# Patient Record
Sex: MALE | Race: WHITE | Employment: FULL TIME | URBAN - METROPOLITAN AREA
[De-identification: names, ages, dates, MRNs, and addresses within clinical notes are randomized per-mention and may not be internally consistent; named-entity substitution may affect disease eponyms.]

---

## 2021-07-19 RX ORDER — SILDENAFIL 100 MG/1
TABLET, FILM COATED ORAL
COMMUNITY
Start: 2020-09-02

## 2021-07-19 NOTE — PROGRESS NOTES
[facility] time__7/26/21 1330__________        Surgery time____1430________    Take the following medications with a sip of water:    Do not eat or drink anything after 12:00 midnight prior to your surgery. This includes water chewing gum, mints and ice chips. You may brush your teeth and gargle the morning of your surgery, but do not swallow the water     Please see your family doctor/pediatrician for a history and physical and/or concerning medications. Bring any test results/reports from your physicians office. If you are under the care of a heart doctor or specialist doctor, please be aware that you may be asked to them for clearance    You may be asked to stop blood thinners such as Coumadin, Plavix, Fragmin, Lovenox, etc., or any anti-inflammatories such as:  Aspirin, Ibuprofen, Advil, Naproxen prior to your surgery. We also ask that you stop any OTC medications such as fish oil, vitamin E, glucosamine, garlic, Multivitamins, COQ 10, etc.    We ask that you do not smoke 24 hours prior to surgery  We ask that you do not  drink any alcoholic beverages 24 hours prior to surgery     You must make arrangements for a responsible adult to take you home after your surgery. For your safety you will not be allowed to leave alone or drive yourself home. Your surgery will be cancelled if you do not have a ride home. Also for your safety, it is strongly suggested that someone stay with you the first 24 hours after your surgery. A parent or legal guardian must accompany a child scheduled for surgery and plan to stay at the hospital until the child is discharged. Please do not bring other children with you. For your comfort, please wear simple loose fitting clothing to the hospital.  Please do not bring valuables.     Do not wear any make-up or nail polish on your fingers or toes      For your safety, please do not wear any jewelry or body piercing's on the day of surgery. All jewelry must be removed. If you have dentures, they will be removed before going to operating room. For your convenience, we will provide you with a container. If you wear contact lenses or glasses, they will be removed, please bring a case for them. If you have a living will and a durable power of  for healthcare, please bring in a copy. As part of our patient safety program to minimize surgical site infections, we ask you to do the following:    · Please notify your surgeon if you develop any illness between         now and the  day of your surgery. · This includes a cough, cold, fever, sore throat, nausea,         or vomiting, and diarrhea, etc.  ·  Please notify your surgeon if you experience dizziness, shortness         of breath or blurred vision between now and the time of your surgery. Do not shave your operative site 96 hours prior to surgery. For face and neck surgery, men may use an electric razor 48 hours   prior to surgery. You may shower the night before surgery or the morning of   your surgery with an antibacterial soap. You will need to bring a photo ID and insurance card    [facility] has an onsite pharmacy, would you like to utilize our pharmacy     If you will be staying overnight and use a C-pap machine, please bring   your C-pap to hospital     Our goal is to provide you with excellent care, therefore, visitors will be limited to two(2) in the room at a time so that we may focus on providing this care for you. Please contact pre-admission testing if you have any further questions. [facility] phone number:  [phone]  Please note these are generalized instructions for all surgical cases, you may be provided with more specific instructions according to your surgery.

## 2021-07-23 ENCOUNTER — ANESTHESIA EVENT (OUTPATIENT)
Dept: ENDOSCOPY | Age: 56
End: 2021-07-23
Payer: COMMERCIAL

## 2021-07-26 ENCOUNTER — HOSPITAL ENCOUNTER (OUTPATIENT)
Age: 56
Setting detail: OUTPATIENT SURGERY
Discharge: HOME | End: 2021-07-26
Attending: INTERNAL MEDICINE | Admitting: INTERNAL MEDICINE
Payer: COMMERCIAL

## 2021-07-26 ENCOUNTER — ANESTHESIA (OUTPATIENT)
Dept: ENDOSCOPY | Age: 56
End: 2021-07-26
Payer: COMMERCIAL

## 2021-07-26 VITALS
BODY MASS INDEX: 24.84 KG/M2 | DIASTOLIC BLOOD PRESSURE: 74 MMHG | WEIGHT: 199.8 LBS | SYSTOLIC BLOOD PRESSURE: 120 MMHG | RESPIRATION RATE: 16 BRPM | TEMPERATURE: 96.5 F | OXYGEN SATURATION: 98 % | HEIGHT: 75 IN | HEART RATE: 65 BPM

## 2021-07-26 VITALS — DIASTOLIC BLOOD PRESSURE: 99 MMHG | OXYGEN SATURATION: 95 % | SYSTOLIC BLOOD PRESSURE: 116 MMHG

## 2021-07-26 DIAGNOSIS — Z86.010 HISTORY OF COLON POLYPS: ICD-10-CM

## 2021-07-26 PROCEDURE — 3700000000 HC ANESTHESIA ATTENDED CARE: Performed by: INTERNAL MEDICINE

## 2021-07-26 PROCEDURE — 88305 TISSUE EXAM BY PATHOLOGIST: CPT

## 2021-07-26 PROCEDURE — 7100000010 HC PHASE II RECOVERY - FIRST 15 MIN: Performed by: INTERNAL MEDICINE

## 2021-07-26 PROCEDURE — 3609010300 HC COLONOSCOPY W/BIOPSY SINGLE/MULTIPLE: Performed by: INTERNAL MEDICINE

## 2021-07-26 PROCEDURE — 7100000011 HC PHASE II RECOVERY - ADDTL 15 MIN: Performed by: INTERNAL MEDICINE

## 2021-07-26 PROCEDURE — 2709999900 HC NON-CHARGEABLE SUPPLY: Performed by: INTERNAL MEDICINE

## 2021-07-26 PROCEDURE — 2580000003 HC RX 258: Performed by: ANESTHESIOLOGY

## 2021-07-26 PROCEDURE — 3700000001 HC ADD 15 MINUTES (ANESTHESIA): Performed by: INTERNAL MEDICINE

## 2021-07-26 PROCEDURE — 6360000002 HC RX W HCPCS: Performed by: NURSE ANESTHETIST, CERTIFIED REGISTERED

## 2021-07-26 RX ORDER — SODIUM CHLORIDE 0.9 % (FLUSH) 0.9 %
10 SYRINGE (ML) INJECTION EVERY 12 HOURS SCHEDULED
Status: DISCONTINUED | OUTPATIENT
Start: 2021-07-26 | End: 2021-07-26 | Stop reason: HOSPADM

## 2021-07-26 RX ORDER — SODIUM CHLORIDE 9 MG/ML
25 INJECTION, SOLUTION INTRAVENOUS PRN
Status: DISCONTINUED | OUTPATIENT
Start: 2021-07-26 | End: 2021-07-26 | Stop reason: HOSPADM

## 2021-07-26 RX ORDER — SODIUM CHLORIDE 9 MG/ML
INJECTION, SOLUTION INTRAVENOUS CONTINUOUS
Status: DISCONTINUED | OUTPATIENT
Start: 2021-07-26 | End: 2021-07-26 | Stop reason: HOSPADM

## 2021-07-26 RX ORDER — ONDANSETRON 2 MG/ML
4 INJECTION INTRAMUSCULAR; INTRAVENOUS
Status: DISCONTINUED | OUTPATIENT
Start: 2021-07-26 | End: 2021-07-26 | Stop reason: HOSPADM

## 2021-07-26 RX ORDER — PROMETHAZINE HYDROCHLORIDE 25 MG/ML
6.25 INJECTION, SOLUTION INTRAMUSCULAR; INTRAVENOUS
Status: DISCONTINUED | OUTPATIENT
Start: 2021-07-26 | End: 2021-07-26 | Stop reason: HOSPADM

## 2021-07-26 RX ORDER — PROPOFOL 10 MG/ML
INJECTION, EMULSION INTRAVENOUS PRN
Status: DISCONTINUED | OUTPATIENT
Start: 2021-07-26 | End: 2021-07-26 | Stop reason: SDUPTHER

## 2021-07-26 RX ORDER — LABETALOL HYDROCHLORIDE 5 MG/ML
5 INJECTION, SOLUTION INTRAVENOUS EVERY 10 MIN PRN
Status: DISCONTINUED | OUTPATIENT
Start: 2021-07-26 | End: 2021-07-26 | Stop reason: HOSPADM

## 2021-07-26 RX ORDER — SODIUM CHLORIDE 0.9 % (FLUSH) 0.9 %
10 SYRINGE (ML) INJECTION PRN
Status: DISCONTINUED | OUTPATIENT
Start: 2021-07-26 | End: 2021-07-26 | Stop reason: HOSPADM

## 2021-07-26 RX ADMIN — SODIUM CHLORIDE: 9 INJECTION, SOLUTION INTRAVENOUS at 13:52

## 2021-07-26 RX ADMIN — PROPOFOL 120 MG: 10 INJECTION, EMULSION INTRAVENOUS at 14:05

## 2021-07-26 RX ADMIN — PROPOFOL 180 MG: 10 INJECTION, EMULSION INTRAVENOUS at 14:20

## 2021-07-26 ASSESSMENT — PAIN SCALES - GENERAL
PAINLEVEL_OUTOF10: 0

## 2021-07-26 ASSESSMENT — PAIN - FUNCTIONAL ASSESSMENT: PAIN_FUNCTIONAL_ASSESSMENT: 0-10

## 2021-07-26 NOTE — ANESTHESIA POSTPROCEDURE EVALUATION
[facility] Department of Anesthesiology  Post-Anesthesia Note       Name:  Marysue Cushing                                  Age:  60 y.o. MRN:  [id number]     Last Vitals & Oxygen Saturation: /64   Pulse 76   Temp 96.5 °F (35.8 °C) (Temporal)   Resp 14   Ht 6' 3\" (1.905 m)   Wt 199 lb 12.8 oz (90.6 kg)   SpO2 96%   BMI 24.97 kg/m²   Patient Vitals for the past 4 hrs:   BP Temp Temp src Pulse Resp SpO2 Height Weight   07/26/21 1425 110/64 96.5 °F (35.8 °C) Temporal 76 14 96 %     07/26/21 1341 (!) 158/73 96.9 °F (36.1 °C) Temporal 72 18 100 % 6' 3\" (1.905 m) 199 lb 12.8 oz (90.6 kg)       Level of consciousness:  Awake, alert    Respiratory: Respirations easy, no distress. Stable. Cardiovascular: Hemodynamically stable. Hydration: Adequate. PONV: Adequately managed. Post-op pain: Adequately controlled. Post-op assessment: Tolerated anesthetic well without complication. Complications:  None.     Dianah Moritz, MD  July 26, 2021   2:32 PM

## 2021-07-26 NOTE — H&P
[employer] Services:     Active Member of Clubs or Organizations:     Attends Club or Organization Meetings:     Marital Status:    Intimate Partner Violence:     Fear of Current or Ex-Partner:     Emotionally Abused:     Physically Abused:     Sexually Abused:      Family History   Problem Relation Age of Onset    Arrhythmia Mother     Heart Failure Mother     Dementia Mother     Diabetes Father     Heart Disease Father     High Blood Pressure Father          PHYSICAL EXAM:      BP (!) 158/73   Pulse 72   Temp 96.9 °F (36.1 °C) (Temporal)   Resp 18   Ht 6' 3\" (1.905 m)   Wt 199 lb 12.8 oz (90.6 kg)   SpO2 100%   BMI 24.97 kg/m²  I        Heart:normal    Lungs: normal    Abdomen: normal      ASA Grade:  See anesthesia note      ASSESSMENT AND PLAN:    1. Procedure options, risks and benefits reviewed with patient and expresses understanding.

## 2021-07-26 NOTE — PROCEDURES
[facility]  Endoscopy Note    Patient: Kiley Lynch  : 1965  Acct#: [de-identified]    Procedure: Colonoscopy with biopsy    Date:  2021    Surgeon:  Alpa Saleem MD, MD    Referring Physician:  Diamond    Previous Colonoscopy: Yes  Date: 14  Greater than 3 years? Yes    Preoperative Diagnosis:  surveillance    Postoperative Diagnosis:  Colon polyp    Anesthesia:  See anesthesia note    Indications: This is a 60y.o. year old male who presents today with surveillance. Procedure: An informed consent was obtained from the patient after explanation of indications, benefits, possible risks and complications of the procedure. The patient was then taken to the endoscopy suite, placed in the left lateral decubitus position, and the above IV anesthesia was administered. A digital rectal examination was performed and revealed negative without mass, lesions or tenderness. The Olympus CFQ-180-AL video colonoscope was placed in the patient's rectum under digital direction and advanced to the cecum. The cecum was identified by characteristic anatomy and ballottment. The ileocecal valve was identified. The preparation was fair. The scope was then withdrawn back through the cecum, ascending, transverse, descending and sigmoid colons. Carefull circumferential examination of the mucosa in these areas demonstrated a 3 mm polyp in the descending colon that was biopsied and removed. The scope was then withdrawn into the rectum and retroflexed. The retroflexed view of the anal verge and rectum demonstrates no abnormalities. The scope was straightened, the colon was decompressed and the scope was withdrawn from the patient. The patient tolerated the procedure well and was taken to the PACU in good condition. Estimated Blood Loss:  none    Impression:  Colon polyp    Recommendations:  Await pathology. Repeat colonoscopy in 5 years.     Alpa Saleem MD, MD Alfred

## 2021-07-26 NOTE — ANESTHESIA PRE PROCEDURE
56  Max: 67   Max taken time: 21 1341  Resp  Av  Min: 25   Min taken time: 21 1341  Max: 18   Max taken time: 21 1341  BP  Min: 158/73   Min taken time: 21 1341  Max: 158/73   Max taken time: 21 1341  SpO2  Av %  Min: 100 %   Min taken time: 21 1341  Max: 100 %   Max taken time: 21 1341    BP Readings from Last 3 Encounters:   21 (!) 158/73     BMI  Body mass index is 24.97 kg/m². Estimated body mass index is 24.97 kg/m² as calculated from the following:    Height as of this encounter: 6' 3\" (1.905 m). Weight as of this encounter: 199 lb 12.8 oz (90.6 kg). CBC No results found for: WBC, RBC, HGB, HCT, MCV, RDW, PLT  CMP  No results found for: NA, K, CL, CO2, BUN, CREATININE, GFRAA, AGRATIO, LABGLOM, GLUCOSE, PROT, CALCIUM, BILITOT, ALKPHOS, AST, ALT  BMP  No results found for: NA, K, CL, CO2, BUN, CREATININE, CALCIUM, GFRAA, LABGLOM, GLUCOSE  POCGlucose  No results for input(s): GLUCOSE in the last 72 hours.    Coags  No results found for: PROTIME, INR, APTT  HCG (If Applicable) No results found for: PREGTESTUR, PREGSERUM, HCG, HCGQUANT   ABGs No results found for: PHART, PO2ART, XEN3AFI, GOJ9FKQ, BEART, Z2CEFKWP   Type & Screen (If Applicable)  No results found for: LABABO, LABRH                         BMI: Wt Readings from Last 3 Encounters:       NPO Status:   Date of last liquid consumption: 21   Time of last liquid consumption: 0815   Date of last solid food consumption: 21      Time of last solid consumption: 1800       Anesthesia Evaluation  Patient summary reviewed no history of anesthetic complications:   Airway: Mallampati: III  TM distance: >3 FB   Neck ROM: full   Dental: normal exam         Pulmonary:Negative Pulmonary ROS and normal exam                               Cardiovascular:Negative CV ROS  Exercise tolerance: good (>4 METS),           Rhythm: regular  Rate: normal           Beta Blocker:  Not on Beta Blocker Neuro/Psych:   (+) psychiatric history:depression/anxiety             GI/Hepatic/Renal: Neg GI/Hepatic/Renal ROS  (+) bowel prep,           Endo/Other: Negative Endo/Other ROS                    Abdominal:             Vascular: negative vascular ROS. Other Findings:             Anesthesia Plan      MAC     ASA 2       Induction: intravenous. Anesthetic plan and risks discussed with patient. Plan discussed with CRNA. This pre-anesthesia assessment may be used as a history and physical.    DOS STAFF ADDENDUM:    Pt seen and examined, chart reviewed (including anesthesia, drug and allergy history). No interval changes to history and physical examination. Anesthetic plan, risks, benefits, alternatives, and personnel involved discussed with patient. Questions and concerns addressed. Patient(family) verbalized an understanding and agrees to proceed.       Edilberto Prather MD  July 26, 2021  1:48 PM

## 2021-10-04 ENCOUNTER — TELEPHONE (OUTPATIENT)
Dept: INTERNAL MEDICINE CLINIC | Age: 56
End: 2021-10-04

## 2021-10-04 NOTE — TELEPHONE ENCOUNTER
----- Message from Pradip Dorsey MA sent at 10/4/2021  8:28 AM EDT -----  Subject: Appointment Request    Reason for Call: New Patient Request Appointment    QUESTIONS  Type of Appointment? New Patient/New to Provider  Reason for appointment request? No appointments available during search  Additional Information for Provider? See Dr Kelly Torrez. He is retiring and Pt's   father(Shawna) sees Dr Ashley Clinton and Dr Ashley Clinton told patient he would take him as   a New Patient. Would like to schedule appt for 2022. Please call Pt   to schedule.   ---------------------------------------------------------------------------  --------------  CALL BACK INFO  What is the best way for the office to contact you? OK to leave message on   voicemail  Preferred Call Back Phone Number? [phone]  ---------------------------------------------------------------------------  --------------  SCRIPT ANSWERS  Relationship to Patient? Self  Specialty Confirmation? Primary Care  Is this the first appointment to establish care for a ? No  Have you been diagnosed with, awaiting test results for, or told that you   are suspected of having COVID-19 (Coronavirus)? (If patient has tested   negative or was tested as a requirement for work, school, or travel and   not based on symptoms, answer no)? No  Within the past two weeks have you developed any of the following symptoms   (answer no if symptoms have been present longer than 2 weeks or began   more than 2 weeks ago)? Fever or Chills, Cough, Shortness of breath or   difficulty breathing, Loss of taste or smell, Sore throat, Nasal   congestion, Sneezing or runny nose, Fatigue or generalized body aches   (answer no if pain is specific to a body part e.g. back pain), Diarrhea,   Headache? No  Have you had close contact with someone with COVID-19 in the last 14 days? No  (Service Expert  click yes below to proceed with kidthing As Usual   Scheduling)?  Yes

## 2021-11-29 ENCOUNTER — TELEPHONE (OUTPATIENT)
Dept: INTERNAL MEDICINE CLINIC | Age: 56
End: 2021-11-29

## 2021-11-29 NOTE — TELEPHONE ENCOUNTER
----- Message from Xenia Joseph sent at 11/29/2021 12:38 PM EST -----  Subject: Message to Provider    QUESTIONS  Information for Provider? Pt interested in getting booster. Question? should he get the antibody testing first OR go ahead and get the booster   shot.  ---------------------------------------------------------------------------  --------------  CALL BACK INFO  What is the best way for the office to contact you? OK to leave message on   voicemail  Preferred Call Back Phone Number? [phone]  ---------------------------------------------------------------------------  --------------  SCRIPT ANSWERS  Relationship to Patient?  Self

## 2022-01-26 ENCOUNTER — TELEPHONE (OUTPATIENT)
Dept: INTERNAL MEDICINE CLINIC | Age: 57
End: 2022-01-26

## 2022-01-26 NOTE — TELEPHONE ENCOUNTER
----- Message from Ida Mahmood sent at 1/26/2022  9:37 AM EST -----  Subject: Message to Provider    QUESTIONS  Information for Provider? pt has a meeting next weekend with about 40-50   people in attendance and no masks, some are vaccinated and some are not,   they will be in a 1,200 sq ft room. pt would like to know if he should go   or is this something he needs to avoid. pt is vaccinated and boosted  ---------------------------------------------------------------------------  --------------  CALL BACK INFO  What is the best way for the office to contact you? OK to leave message on   voicemail  Preferred Call Back Phone Number? [phone]  ---------------------------------------------------------------------------  --------------  SCRIPT ANSWERS  Relationship to Patient?  Self

## 2022-05-17 ENCOUNTER — OFFICE VISIT (OUTPATIENT)
Dept: INTERNAL MEDICINE CLINIC | Age: 57
End: 2022-05-17
Payer: COMMERCIAL

## 2022-05-17 VITALS
HEIGHT: 75 IN | SYSTOLIC BLOOD PRESSURE: 125 MMHG | DIASTOLIC BLOOD PRESSURE: 83 MMHG | WEIGHT: 200 LBS | BODY MASS INDEX: 24.87 KG/M2

## 2022-05-17 DIAGNOSIS — Z12.5 PROSTATE CANCER SCREENING: ICD-10-CM

## 2022-05-17 DIAGNOSIS — R00.2 PALPITATIONS: Primary | ICD-10-CM

## 2022-05-17 DIAGNOSIS — Z00.00 WELL ADULT EXAM: ICD-10-CM

## 2022-05-17 PROCEDURE — 93000 ELECTROCARDIOGRAM COMPLETE: CPT | Performed by: HOSPITALIST

## 2022-05-17 PROCEDURE — 99203 OFFICE O/P NEW LOW 30 MIN: CPT | Performed by: HOSPITALIST

## 2022-05-17 SDOH — ECONOMIC STABILITY: FOOD INSECURITY: WITHIN THE PAST 12 MONTHS, THE FOOD YOU BOUGHT JUST DIDN'T LAST AND YOU DIDN'T HAVE MONEY TO GET MORE.: NEVER TRUE

## 2022-05-17 SDOH — ECONOMIC STABILITY: FOOD INSECURITY: WITHIN THE PAST 12 MONTHS, YOU WORRIED THAT YOUR FOOD WOULD RUN OUT BEFORE YOU GOT MONEY TO BUY MORE.: NEVER TRUE

## 2022-05-17 ASSESSMENT — PATIENT HEALTH QUESTIONNAIRE - PHQ9
SUM OF ALL RESPONSES TO PHQ QUESTIONS 1-9: 0
SUM OF ALL RESPONSES TO PHQ QUESTIONS 1-9: 0
SUM OF ALL RESPONSES TO PHQ9 QUESTIONS 1 & 2: 0
1. LITTLE INTEREST OR PLEASURE IN DOING THINGS: 0
SUM OF ALL RESPONSES TO PHQ QUESTIONS 1-9: 0
2. FEELING DOWN, DEPRESSED OR HOPELESS: 0
SUM OF ALL RESPONSES TO PHQ QUESTIONS 1-9: 0

## 2022-05-17 ASSESSMENT — ENCOUNTER SYMPTOMS
RESPIRATORY NEGATIVE: 1
GASTROINTESTINAL NEGATIVE: 1
ALLERGIC/IMMUNOLOGIC NEGATIVE: 1

## 2022-05-17 ASSESSMENT — SOCIAL DETERMINANTS OF HEALTH (SDOH): HOW HARD IS IT FOR YOU TO PAY FOR THE VERY BASICS LIKE FOOD, HOUSING, MEDICAL CARE, AND HEATING?: NOT HARD AT ALL

## 2022-05-17 NOTE — PROGRESS NOTES
Outpatient Note for New Patient Visit    Patient:  Kermit Schwab                                               : 1965  Age: 60 y.o. MRN: [id number]  Date : 2022    History Obtained From:  patient      OF PRESENT ILLNESS:   The patient is a 60 y.o. male who presents to establish his care in our medical office. His previous [facility]  Had episodes of heart palpitations which started 5 days ago. It lasted for about 3 days. Works a lot, consumes large amount of coffee. Didn't have coffee in the last 2 days. Does not have any previous history of coronary artery disease/CHF. No history of hypertension/COPD. Exercises regularly. Past Medical History:        Diagnosis Date    ADD (attention deficit disorder)     Anxiety     Hematuria 2021       Past Surgical History:        Procedure Laterality Date    COLONOSCOPY      COLONOSCOPY N/A 2021    COLONOSCOPY WITH BIOPSY performed by Marcell Alvarez MD at [facility] ENDOSCOPY       Family History:       Problem Relation Age of Onset    Arrhythmia Mother     Heart Failure Mother     Dementia Mother     Diabetes Father     Heart Disease Father     High Blood Pressure Father        Social History:   TOBACCO:   reports that he has never smoked. He has never used smokeless tobacco.  ETOH:   reports current alcohol use. OCCUPATION:   with [employer]  Has 2 adult children from his first marriage. Exercises regularly. Allergies:  Patient has no known allergies. Current Medications:    Prior to Admission medications    Medication Sig Start Date End Date Taking? Authorizing Provider   sildenafil (VIAGRA) 100 MG tablet TAKE 1/2 TO ONE TABLET BY MOUTH ONCE DAILY AS NEEDED 20  Yes Historical Provider, MD       REVIEW OF SYSTEMS:  Review of Systems   Constitutional: Negative. HENT: Negative.     Eyes:        + early L eye cataract Respiratory: Negative. Cardiovascular: Positive for palpitations. Gastrointestinal: Negative. Endocrine: Negative. Genitourinary: Negative. Musculoskeletal: Negative. Skin: Negative. Allergic/Immunologic: Negative. Neurological: Negative. Psychiatric/Behavioral: Negative. Physical Exam:      Vitals: /83   Ht 6' 3\" (1.905 m)   Wt 200 lb (90.7 kg)   BMI 25.00 kg/m²     Body mass index is 25 kg/m². Wt Readings from Last 3 Encounters:   05/17/22 200 lb (90.7 kg)   07/26/21 199 lb 12.8 oz (90.6 kg)     Physical Exam  Vitals and nursing note reviewed. Constitutional:       General: He is not in acute distress. Appearance: Normal appearance. He is well-developed. HENT:      Head: Normocephalic and atraumatic. Right Ear: Tympanic membrane, ear canal and external ear normal. There is no impacted cerumen. Left Ear: Tympanic membrane, ear canal and external ear normal. There is no impacted cerumen. Mouth/Throat:      Mouth: Mucous membranes are moist.      Pharynx: Oropharynx is clear. No oropharyngeal exudate or posterior oropharyngeal erythema. Eyes:      General: No scleral icterus. Extraocular Movements: Extraocular movements intact. Pupils: Pupils are equal, round, and reactive to light. Neck:      Vascular: No carotid bruit or JVD. Cardiovascular:      Rate and Rhythm: Normal rate and regular rhythm. Heart sounds: Normal heart sounds. No murmur heard. No friction rub. No gallop. Pulmonary:      Effort: Pulmonary effort is normal. No respiratory distress. Breath sounds: Normal breath sounds. No wheezing or rales. Abdominal:      General: Bowel sounds are normal. There is no distension. Palpations: Abdomen is soft. Tenderness: There is no abdominal tenderness. There is no right CVA tenderness or left CVA tenderness. Musculoskeletal:         General: Normal range of motion.       Cervical back: Normal range of motion. Right lower leg: No edema. Left lower leg: No edema. Lymphadenopathy:      Cervical: No cervical adenopathy. Skin:     General: Skin is warm and dry. Capillary Refill: Capillary refill takes less than 2 seconds. Findings: No erythema, lesion or rash. Neurological:      General: No focal deficit present. Mental Status: He is alert and oriented to person, place, and time. Cranial Nerves: No cranial nerve deficit. Coordination: Coordination normal.      Gait: Gait normal.      Deep Tendon Reflexes: Reflexes normal.   Psychiatric:         Mood and Affect: Mood normal.         Behavior: Behavior normal.         EKG: NSR, no acute ischemic change  HealthMaintenance:   Up-to-date     Assessment/Plan:   Tri Buckner was seen today for new patient. Diagnoses and all orders for this visit:    Palpitations  -     EKG 12 Lead  -     Strongly encouraged to make positive changes in his lifestyle (reduce working hours, reduce coffee consumption, continue his regular exercise)    Well adult exam  -     CBC with Auto Differential; Future  -     Comprehensive Metabolic Panel; Future  -     Lipid Panel; Future  -     TSH; Future  -     Vitamin D 25 Hydroxy; Future    Prostate cancer screening  -     PSA Screening;  Future            Return in about 6 months (around 11/17/2022), or if symptoms worsen or fail to improve, for for annual physical exam.     Yahaira Reid MD, M.MARZENA.   5/17/2022, 1:20 PM

## 2022-06-07 DIAGNOSIS — Z00.00 WELL ADULT EXAM: ICD-10-CM

## 2022-06-07 DIAGNOSIS — Z12.5 PROSTATE CANCER SCREENING: ICD-10-CM

## 2022-06-07 LAB
A/G RATIO: 2.7 (ref 1.1–2.2)
ALBUMIN SERPL-MCNC: 4.6 G/DL (ref 3.4–5)
ALP BLD-CCNC: 64 U/L (ref 40–129)
ALT SERPL-CCNC: 19 U/L (ref 10–40)
ANION GAP SERPL CALCULATED.3IONS-SCNC: 12 MMOL/L (ref 3–16)
AST SERPL-CCNC: 18 U/L (ref 15–37)
BASOPHILS ABSOLUTE: 0 K/UL (ref 0–0.2)
BASOPHILS RELATIVE PERCENT: 0.5 %
BILIRUB SERPL-MCNC: 0.6 MG/DL (ref 0–1)
BUN BLDV-MCNC: 18 MG/DL (ref 7–20)
CALCIUM SERPL-MCNC: 9.3 MG/DL (ref 8.3–10.6)
CHLORIDE BLD-SCNC: 100 MMOL/L (ref 99–110)
CHOLESTEROL, TOTAL: 206 MG/DL (ref 0–199)
CO2: 23 MMOL/L (ref 21–32)
CREAT SERPL-MCNC: 0.9 MG/DL (ref 0.9–1.3)
EOSINOPHILS ABSOLUTE: 0.1 K/UL (ref 0–0.6)
EOSINOPHILS RELATIVE PERCENT: 1.3 %
GFR AFRICAN AMERICAN: >60
GFR NON-AFRICAN AMERICAN: >60
GLUCOSE BLD-MCNC: 96 MG/DL (ref 70–99)
HCT VFR BLD CALC: 42.6 % (ref 40.5–52.5)
HDLC SERPL-MCNC: 45 MG/DL (ref 40–60)
HEMOGLOBIN: 15.1 G/DL (ref 13.5–17.5)
LDL CHOLESTEROL CALCULATED: 145 MG/DL
LYMPHOCYTES ABSOLUTE: 1.6 K/UL (ref 1–5.1)
LYMPHOCYTES RELATIVE PERCENT: 33.2 %
MCH RBC QN AUTO: 33.6 PG (ref 26–34)
MCHC RBC AUTO-ENTMCNC: 35.5 G/DL (ref 31–36)
MCV RBC AUTO: 94.7 FL (ref 80–100)
MONOCYTES ABSOLUTE: 0.5 K/UL (ref 0–1.3)
MONOCYTES RELATIVE PERCENT: 9.8 %
NEUTROPHILS ABSOLUTE: 2.6 K/UL (ref 1.7–7.7)
NEUTROPHILS RELATIVE PERCENT: 55.2 %
PDW BLD-RTO: 13.1 % (ref 12.4–15.4)
PLATELET # BLD: 215 K/UL (ref 135–450)
PMV BLD AUTO: 9.3 FL (ref 5–10.5)
POTASSIUM SERPL-SCNC: 4.6 MMOL/L (ref 3.5–5.1)
PROSTATE SPECIFIC ANTIGEN: 2.15 NG/ML (ref 0–4)
RBC # BLD: 4.5 M/UL (ref 4.2–5.9)
SODIUM BLD-SCNC: 135 MMOL/L (ref 136–145)
TOTAL PROTEIN: 6.3 G/DL (ref 6.4–8.2)
TRIGL SERPL-MCNC: 80 MG/DL (ref 0–150)
TSH SERPL DL<=0.05 MIU/L-ACNC: 2.13 UIU/ML (ref 0.27–4.2)
VITAMIN D 25-HYDROXY: 51.2 NG/ML
VLDLC SERPL CALC-MCNC: 16 MG/DL
WBC # BLD: 4.8 K/UL (ref 4–11)

## 2022-06-14 ENCOUNTER — OFFICE VISIT (OUTPATIENT)
Dept: INTERNAL MEDICINE CLINIC | Age: 57
End: 2022-06-14
Payer: COMMERCIAL

## 2022-06-14 VITALS
DIASTOLIC BLOOD PRESSURE: 79 MMHG | HEIGHT: 75 IN | WEIGHT: 198 LBS | SYSTOLIC BLOOD PRESSURE: 120 MMHG | HEART RATE: 61 BPM | BODY MASS INDEX: 24.62 KG/M2

## 2022-06-14 DIAGNOSIS — Z00.00 ENCOUNTER FOR WELL ADULT EXAM WITHOUT ABNORMAL FINDINGS: Primary | ICD-10-CM

## 2022-06-14 DIAGNOSIS — R00.2 PALPITATIONS: ICD-10-CM

## 2022-06-14 PROCEDURE — 99396 PREV VISIT EST AGE 40-64: CPT | Performed by: HOSPITALIST

## 2022-06-14 ASSESSMENT — ENCOUNTER SYMPTOMS
ALLERGIC/IMMUNOLOGIC NEGATIVE: 1
GASTROINTESTINAL NEGATIVE: 1

## 2022-06-14 NOTE — PROGRESS NOTES
Well Adult Note  Name: Porfirio Dixon Date: 2022   MRN: [id number] Sex: Male   Age: 60 y.o. Ethnicity: Non- / Non    : 1965 Race: White (non-)      Nuria Lewis is here for well adult exam.  History:  Doing well overall. Reports occasional heart palpitations. Exercises regularly (competitive ball dancing). Does not smoke, does not drink alcohol. Adheres to low-fat/low-cholesterol diet. Review of Systems   Constitutional: Negative. HENT: Negative. Eyes: Positive for visual disturbance (some floaters). Had eye exam 1 month ago. Was evaluated [place] at [facility]; was found to have early cataracts   Cardiovascular: Positive for palpitations (intermittent. Much better when he is off coffee). Gastrointestinal: Negative. Endocrine: Negative. Genitourinary: Negative. Musculoskeletal: Negative. Skin: Negative. Allergic/Immunologic: Negative. Neurological: Negative. Psychiatric/Behavioral: Negative. No Known Allergies      Prior to Visit Medications    Medication Sig Taking? Authorizing Provider   sildenafil (VIAGRA) 100 MG tablet TAKE 1/2 TO ONE TABLET BY MOUTH ONCE DAILY AS NEEDED Yes Historical Provider, MD         Past Medical History:   Diagnosis Date    ADD (attention deficit disorder)     Anxiety     Hematuria 2021       Past Surgical History:   Procedure Laterality Date    COLONOSCOPY      COLONOSCOPY N/A 2021    COLONOSCOPY WITH BIOPSY performed by Shyam Calle MD at [facility] ENDOSCOPY         Family History   Problem Relation Age of Onset    Arrhythmia Mother     Heart Failure Mother     Dementia Mother     Diabetes Father     Heart Disease Father     High Blood Pressure Father        Social History     Tobacco Use    Smoking status: Never Smoker    Smokeless tobacco: Never Used   Vaping Use    Vaping Use: Never used   Substance Use Topics    Alcohol use:  Yes    Drug use: Never       Objective   BP 120/79   Pulse 61   Ht 6' 3\" (1.905 m)   Wt 198 lb (89.8 kg)   BMI 24.75 kg/m²   Wt Readings from Last 3 Encounters:   06/14/22 198 lb (89.8 kg)   05/17/22 200 lb (90.7 kg)   07/26/21 199 lb 12.8 oz (90.6 kg)     There were no vitals filed for this visit. Physical Exam  Vitals and nursing note reviewed. Constitutional:       General: He is not in acute distress. Appearance: Normal appearance. He is well-developed. He is not ill-appearing or diaphoretic. HENT:      Head: Normocephalic and atraumatic. Right Ear: Tympanic membrane, ear canal and external ear normal. There is no impacted cerumen. Left Ear: Tympanic membrane, ear canal and external ear normal. There is no impacted cerumen. Mouth/Throat:      Mouth: Mucous membranes are moist.      Pharynx: Oropharynx is clear. No oropharyngeal exudate or posterior oropharyngeal erythema. Eyes:      General: No scleral icterus. Extraocular Movements: Extraocular movements intact. Pupils: Pupils are equal, round, and reactive to light. Neck:      Vascular: No carotid bruit or JVD. Cardiovascular:      Rate and Rhythm: Normal rate and regular rhythm. Heart sounds: Normal heart sounds. No murmur heard. No friction rub. No gallop. Pulmonary:      Effort: Pulmonary effort is normal. No respiratory distress. Breath sounds: Normal breath sounds. No wheezing or rales. Abdominal:      General: Bowel sounds are normal. There is no distension. Palpations: Abdomen is soft. Tenderness: There is no abdominal tenderness. There is no right CVA tenderness or left CVA tenderness. Musculoskeletal:         General: Normal range of motion. Cervical back: Normal range of motion and neck supple. Right lower leg: No edema. Left lower leg: No edema. Lymphadenopathy:      Cervical: No cervical adenopathy. Skin:     General: Skin is warm and dry. Findings: No erythema, lesion or rash.    Neurological: General: No focal deficit present. Mental Status: He is alert and oriented to person, place, and time. Cranial Nerves: No cranial nerve deficit. Sensory: No sensory deficit. Psychiatric:         Mood and Affect: Mood normal.           Assessment   Plan   1. Encounter for well adult exam without abnormal findings  Encouraged to continue with regular exercise and heart healthy diet    2. Palpitations  Encouraged Mr. Dante Rodriguez to stay well-hydrated and reduce caffeine consumption         Personalized Preventive Plan   Current Health Maintenance Status  Immunization History   Administered Date(s) Administered    COVID-19, Pfizer Purple top, DILUTE for use, 12+ yrs, 30mcg/0.3mL dose 05/07/2021, 05/29/2021, 12/12/2021        Health Maintenance   Topic Date Due    HIV screen  Never done    Hepatitis C screen  Never done    Shingles vaccine (1 of 2) Never done    Flu vaccine (Season Ended) 09/01/2022    Depression Screen  05/17/2023    Prostate Specific Antigen (PSA) Screening or Monitoring  06/07/2023    Lipids  06/07/2027    DTaP/Tdap/Td vaccine (3 - Td or Tdap) 03/27/2029    Colorectal Cancer Screen  07/26/2031    COVID-19 Vaccine  Completed    Hepatitis A vaccine  Aged Out    Hepatitis B vaccine  Aged Out    Hib vaccine  Aged Out    Meningococcal (ACWY) vaccine  Aged Out    Pneumococcal 0-64 years Vaccine  Aged Out     Recommendations for [employer] Due: see orders and patient instructions/AVS.    Return in about 1 year (around 6/14/2023), or if symptoms worsen or fail to improve.     Delilah Mullins MD

## 2022-09-23 ENCOUNTER — TELEPHONE (OUTPATIENT)
Dept: INTERNAL MEDICINE CLINIC | Age: 57
End: 2022-09-23

## 2022-10-31 ENCOUNTER — TELEPHONE (OUTPATIENT)
Dept: INTERNAL MEDICINE CLINIC | Age: 57
End: 2022-10-31

## 2022-10-31 DIAGNOSIS — Z83.49 FAMILY HISTORY OF HEMOCHROMATOSIS: Primary | ICD-10-CM

## 2022-10-31 NOTE — TELEPHONE ENCOUNTER
----- Message from Jadyn Roman sent at 10/31/2022  3:07 PM EDT -----  Subject: Message to Provider    QUESTIONS  Information for Provider? Pt's sister has been dx for hemochromatosis. He   is wanting to see if he could get a blood test to see if he has this as   well. No symptoms and no hurry just for peace of mind. Please call pt to   advise.  ---------------------------------------------------------------------------  --------------  [facility] INFO  [phone]; OK to leave message on voicemail  ---------------------------------------------------------------------------  --------------  SCRIPT ANSWERS  Relationship to Patient?  Self

## 2022-11-21 DIAGNOSIS — Z83.49 FAMILY HISTORY OF HEMOCHROMATOSIS: ICD-10-CM

## 2022-11-21 LAB — FERRITIN: 568.8 NG/ML (ref 30–400)

## 2022-11-22 ENCOUNTER — TELEPHONE (OUTPATIENT)
Dept: INTERNAL MEDICINE CLINIC | Age: 57
End: 2022-11-22

## 2022-11-22 NOTE — TELEPHONE ENCOUNTER
[facility] Records [place] [address] Practice Support (supporting Chente Sutton MD) 3 hours ago (7:10 AM)     Demetria Kent, my sister has been diagnosed with hemachromatosis. She suggested that I get a ferritin test, and the result came up at a score of 538. Is this something that I should be concerned about?       Thanks, Roni Abdul

## 2022-11-27 DIAGNOSIS — Z83.49 FAMILY HISTORY OF HEMOCHROMATOSIS: Primary | ICD-10-CM

## 2022-11-28 DIAGNOSIS — Z83.49 FAMILY HISTORY OF HEMOCHROMATOSIS: ICD-10-CM

## 2022-11-30 ENCOUNTER — PATIENT MESSAGE (OUTPATIENT)
Dept: INTERNAL MEDICINE CLINIC | Age: 57
End: 2022-11-30

## 2022-12-01 ENCOUNTER — TELEPHONE (OUTPATIENT)
Dept: INTERNAL MEDICINE CLINIC | Age: 57
End: 2022-12-01

## 2022-12-01 DIAGNOSIS — Z83.49 FAMILY HISTORY OF HEMOCHROMATOSIS: Primary | ICD-10-CM

## 2022-12-01 NOTE — TELEPHONE ENCOUNTER
Irina Hernandez [place] [address] Practice Support (supporting Derek Emmanuel MD) 14 hours ago (7:31 PM)     Elise Mcardle,     I gave a pint of blood on Monday and I would like to have another [place] in the blood test to see how much the iron levels dropped. Possibly, I should give another pint if the iron levels are still high?      Vi Phipps  [phone]

## 2022-12-02 DIAGNOSIS — Z83.49 FAMILY HISTORY OF HEMOCHROMATOSIS: ICD-10-CM

## 2022-12-02 LAB — FERRITIN: 471.5 NG/ML (ref 30–400)

## 2022-12-03 ENCOUNTER — PATIENT MESSAGE (OUTPATIENT)
Dept: INTERNAL MEDICINE CLINIC | Age: 57
End: 2022-12-03

## 2022-12-03 LAB
C282Y HEMOCHROMATOSIS MUT: NORMAL
H63D HEMOCHROMATOSIS MUT: NEGATIVE
HEMOCHROMATOSIS GENE ANALYSIS: NORMAL
HFE PCR SPECIMEN: NORMAL
S65C HEMOCHROMATOSIS MUT: NEGATIVE

## 2022-12-05 ENCOUNTER — TELEPHONE (OUTPATIENT)
Dept: INTERNAL MEDICINE CLINIC | Age: 57
End: 2022-12-05

## 2022-12-05 NOTE — TELEPHONE ENCOUNTER
Ferritin  progress   (Newest Message First)  Jorge Romero [place] [address] Practice Support (supporting Varsha Richardson MD) 1 hour ago (1:00 PM)     Angel Valdez, here is another question. I wonder if Dr. Cirilo Sandoval has seen the genetic testing results? It seems to be homozygotes for the one of the genes. What comments does Dr. Cirilo Sandoval have in relation to this?      [facility]   [phone]

## 2022-12-05 NOTE — TELEPHONE ENCOUNTER
Ferritin  progress   (Newest Message First)  [facility] [place] [address] Practice Support (supporting Arvin Barbosa MD) 2 days ago     Joan Simon,     1st test: 27-21-80-10     Then donate a pint     2nd test: 471     Should I schedule an appointment to donate another pint? Would I need an order from you to donate this second pint? The blood ward typically allow a once every 3 months donation .      Anthony Judd  [phone]

## 2022-12-12 ENCOUNTER — OFFICE VISIT (OUTPATIENT)
Dept: INTERNAL MEDICINE CLINIC | Age: 57
End: 2022-12-12
Payer: COMMERCIAL

## 2022-12-12 VITALS
WEIGHT: 201 LBS | HEIGHT: 75 IN | BODY MASS INDEX: 24.99 KG/M2 | HEART RATE: 68 BPM | SYSTOLIC BLOOD PRESSURE: 138 MMHG | DIASTOLIC BLOOD PRESSURE: 79 MMHG

## 2022-12-12 DIAGNOSIS — N52.9 ERECTILE DYSFUNCTION, UNSPECIFIED ERECTILE DYSFUNCTION TYPE: ICD-10-CM

## 2022-12-12 DIAGNOSIS — E83.110 HEREDITARY HEMOCHROMATOSIS (HCC): Primary | ICD-10-CM

## 2022-12-12 PROCEDURE — 99213 OFFICE O/P EST LOW 20 MIN: CPT | Performed by: HOSPITALIST

## 2022-12-12 RX ORDER — SILDENAFIL 100 MG/1
TABLET, FILM COATED ORAL
Qty: 30 TABLET | Refills: 1 | Status: SHIPPED | OUTPATIENT
Start: 2022-12-12

## 2022-12-12 NOTE — PROGRESS NOTES
Follow Up Visit Established Patient Visit    Patient:  Jose F Sampson                                               : 1965  Age: 59 y.o. MRN: [id number]  Date : 2022    Jose F Sampson is a 59 y.o. male who presents for :  follow up appointment. Chief Complaint   Patient presents with    Results     His sister was recently diagnosed with hemochromatosis. 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 Ferritin on 22 was 568. 8; the patient donated blood 3 weeks ago; repeat ferritin on 22 was 471.5     Order: [id number]  Status: Final result    Visible to patient: Yes (seen)    Next appt: None    Dx: Family history of hemochromatosis    1 Result Note  Component Ref Range & Units 22 0658 22 0738   Ferritin 30.0 - 400.0 ng/mL 471.5 High   568.8 High     Resulting Agency  [address] [facility] [address]            He drinks one 12 oz beer every month and one 12 oz non-alcoholic beer every couple of weeks. Reports some erectile dysfunction. Takes sildenafil 50 mg PO daily as needed with good symptom control. Past Medical History:   Diagnosis Date    ADD (attention deficit disorder)     Anxiety     Hematuria 2021       Past Surgical History:   Procedure Laterality Date    COLONOSCOPY      COLONOSCOPY N/A 2021    COLONOSCOPY WITH BIOPSY performed by Caro Contreras MD at [facility] ENDOSCOPY       Current Outpatient Medications   Medication Sig Dispense Refill    sildenafil (VIAGRA) 100 MG tablet TAKE 1/2 TO ONE TABLET BY MOUTH ONCE DAILY AS NEEDED 30 tablet 1     No current facility-administered medications for this visit. /79   Pulse 68   Ht 6' 3\" (1.905 m)   Wt 201 lb (91.2 kg)   BMI 25.12 kg/m²     Physical Exam   Physical Exam  Vitals and nursing note reviewed. Constitutional:       General: He is not in acute distress. Appearance: He is well-developed.    HENT:      Head: Normocephalic and atraumatic. Right Ear: Tympanic membrane, ear canal and external ear normal. There is no impacted cerumen. Left Ear: Tympanic membrane, ear canal and external ear normal. There is no impacted cerumen. Nose:      Comments: Nasal mucosa is moderately swollen and mildly inflamed     Mouth/Throat:      Mouth: Mucous membranes are moist.      Pharynx: Oropharynx is clear. No oropharyngeal exudate or posterior oropharyngeal erythema. Eyes:      General: No scleral icterus. Pupils: Pupils are equal, round, and reactive to light. Neck:      Vascular: No carotid bruit or JVD. Cardiovascular:      Rate and Rhythm: Normal rate and regular rhythm. Heart sounds: Normal heart sounds. No murmur heard. No friction rub. No gallop. Pulmonary:      Effort: Pulmonary effort is normal. No respiratory distress. Breath sounds: Normal breath sounds. No wheezing or rales. Abdominal:      General: Bowel sounds are normal. There is no distension. Palpations: Abdomen is soft. Tenderness: There is no abdominal tenderness. There is no right CVA tenderness or left CVA tenderness. Musculoskeletal:         General: No tenderness. Normal range of motion. Cervical back: Normal range of motion and neck supple. Right lower leg: No edema. Left lower leg: No edema. Lymphadenopathy:      Cervical: No cervical adenopathy. Skin:     General: Skin is warm and dry. Findings: No erythema, lesion or rash. Neurological:      Mental Status: He is alert and oriented to person, place, and time. Cranial Nerves: No cranial nerve deficit. Gait: Gait normal.      Deep Tendon Reflexes: Reflexes normal.       Assessment/ plan:     Miriam Ochoa was seen today for results.     Diagnoses and all orders for this visit:    Hereditary hemochromatosis ([facility]  -     Reilly Salinas MD, Gastroenterology, [facility]  -     will continue with blood donation every 4 weeks x 4, then every 6 weeks. Target Ferritin under 60.  -     dietary modifications were reviewed and encouraged  -     will repeat serum ferritin and iron studies in about 4 months    Erectile dysfunction, unspecified erectile dysfunction type  -     sildenafil (VIAGRA) 100 MG tablet; TAKE 1/2 TO ONE TABLET BY MOUTH ONCE DAILY AS NEEDED      Return in about 3 months (around 3/12/2023) for hemochromatosis.     Tiffanie Ying MD

## 2022-12-13 ENCOUNTER — TELEPHONE (OUTPATIENT)
Dept: INTERNAL MEDICINE CLINIC | Age: 57
End: 2022-12-13

## 2022-12-13 NOTE — TELEPHONE ENCOUNTER
Miguel Hobbs [place] [address] Practice Support (supporting Jacque Arcos MD) 1 hour ago (1:00 PM)     Alexia Godoy, thank you for all your good advice and comments yesterday. I am happy to go with Georgina Crowley  as a hematologist, and his schedule is full for the rest of the year. Also, I would like to see if I could get any testing done in 2022 . .I fully understand that testing might be recommended or then again it may not. Is there anyone else besides Georgina Crowley that you could recommend?

## 2022-12-14 ENCOUNTER — PATIENT MESSAGE (OUTPATIENT)
Dept: INTERNAL MEDICINE CLINIC | Age: 57
End: 2022-12-14

## 2022-12-14 ENCOUNTER — TELEPHONE (OUTPATIENT)
Dept: INTERNAL MEDICINE CLINIC | Age: 57
End: 2022-12-14

## 2022-12-14 DIAGNOSIS — E83.110 HEREDITARY HEMOCHROMATOSIS (HCC): Primary | ICD-10-CM

## 2022-12-14 NOTE — TELEPHONE ENCOUNTER
From: David Rahman  To: Dr. Fani Parker  Sent: 12/14/2022 1:35 PM EST  Subject: Another ferritin blood test request     Hi Dr. Isis Larkin, I am donating blood today and would like to get another blood test at the lab to see how the ferritin dropped .     Would you send a note to the lab so that I can go in for the ferritin test?     Thx,  Urban Cough

## 2022-12-14 NOTE — TELEPHONE ENCOUNTER
Patient went to donate blood today and they could not accept it because the was incomplete. They are going to fax back the order and identify what they need to complete the order.

## 2022-12-16 ENCOUNTER — TELEPHONE (OUTPATIENT)
Dept: INTERNAL MEDICINE CLINIC | Age: 57
End: 2022-12-16

## 2022-12-16 NOTE — TELEPHONE ENCOUNTER
----- Message from Ab Judith sent at 12/16/2022 11:47 AM EST -----  Subject: Message to Provider    QUESTIONS  Information for Provider? Patient gave a blood donation and says that   [facility] is needing his [employer] numbers faxed to them. They sent a form   over to office. Patient wants to know if office received that and if it   was sent back. Please call patient to advise.   ---------------------------------------------------------------------------  --------------  [facility] INFO  [phone]; OK to leave message on voicemail  ---------------------------------------------------------------------------  --------------  SCRIPT ANSWERS  Relationship to Patient?  Self

## 2022-12-16 NOTE — TELEPHONE ENCOUNTER
----- Message from Kacey Jacques sent at 12/16/2022  3:45 PM EST -----  Subject: Message to Provider    QUESTIONS  Information for Provider? pt was calling about his form to [facility].   ---------------------------------------------------------------------------  --------------  [address]  [phone]; OK to leave message on voicemail  ---------------------------------------------------------------------------  --------------  SCRIPT ANSWERS  Relationship to Patient?  Self

## 2022-12-19 ENCOUNTER — TELEPHONE (OUTPATIENT)
Dept: INTERNAL MEDICINE CLINIC | Age: 57
End: 2022-12-19

## 2022-12-19 NOTE — TELEPHONE ENCOUNTER
Patient came in to see if the paperwork has been completed or faxed in.  He brought in the paperwork and I left it in dr Miley Faulkner box    Please call mr prieto with details on this letter

## 2022-12-19 NOTE — TELEPHONE ENCOUNTER
----- Message from Perrygiovanilaura Austenlexus sent at 12/19/2022  8:26 AM EST -----  Subject: Message to Provider    QUESTIONS  Information for Provider? Pt dropped off paperwork this morning 12/19/22,   Pt does not want paperwork faxed back he would like to be contacted to   come pick it up when it is signed by his provider  ---------------------------------------------------------------------------  --------------  [facility]  [phone]; OK to leave message on voicemail  ---------------------------------------------------------------------------  --------------  SCRIPT ANSWERS  Relationship to Patient?  Self

## 2023-01-04 DIAGNOSIS — E83.110 HEREDITARY HEMOCHROMATOSIS (HCC): ICD-10-CM

## 2023-01-04 LAB — FERRITIN: 279.3 NG/ML (ref 30–400)

## 2023-01-12 ENCOUNTER — OFFICE VISIT (OUTPATIENT)
Dept: INTERNAL MEDICINE CLINIC | Age: 58
End: 2023-01-12

## 2023-01-12 VITALS
WEIGHT: 205 LBS | HEIGHT: 75 IN | BODY MASS INDEX: 25.49 KG/M2 | SYSTOLIC BLOOD PRESSURE: 148 MMHG | DIASTOLIC BLOOD PRESSURE: 80 MMHG

## 2023-01-12 DIAGNOSIS — R03.0 ELEVATED BP WITHOUT DIAGNOSIS OF HYPERTENSION: ICD-10-CM

## 2023-01-12 DIAGNOSIS — Z78.9 EXCESSIVE CAFFEINE INTAKE: ICD-10-CM

## 2023-01-12 DIAGNOSIS — E16.2 HYPOGLYCEMIA: Primary | ICD-10-CM

## 2023-01-12 DIAGNOSIS — E83.110 HEREDITARY HEMOCHROMATOSIS (HCC): ICD-10-CM

## 2023-01-12 LAB
CHP ED QC CHECK: ABNORMAL
GLUCOSE BLD-MCNC: 106 MG/DL

## 2023-01-12 ASSESSMENT — PATIENT HEALTH QUESTIONNAIRE - PHQ9
SUM OF ALL RESPONSES TO PHQ QUESTIONS 1-9: 0
SUM OF ALL RESPONSES TO PHQ9 QUESTIONS 1 & 2: 0
1. LITTLE INTEREST OR PLEASURE IN DOING THINGS: 0
2. FEELING DOWN, DEPRESSED OR HOPELESS: 0
SUM OF ALL RESPONSES TO PHQ QUESTIONS 1-9: 0

## 2023-01-12 ASSESSMENT — ENCOUNTER SYMPTOMS
SORE THROAT: 0
SHORTNESS OF BREATH: 0
CONSTIPATION: 0
VOMITING: 0
RHINORRHEA: 0
ABDOMINAL PAIN: 0
NAUSEA: 0
DIARRHEA: 0
COUGH: 0

## 2023-01-12 NOTE — PROGRESS NOTES
Follow Up Visit Established Patient Visit    Patient:  Edmond Cano                                               : 1965  Age: 59 y.o. MRN: [id number]  Date : 2023    Edmond Cano is a 59 y.o. male who presents for :  concern for hypoglycemia    Chief Complaint   Patient presents with    Hypoglycemia     Had 4 cups of coffee X 4 days ago. Very minimal PO intake during that day    [place] sort of weird and not his usual self. \"Hole in his head\"  No headache, CP, SOB  Concerned it was hypoglycemia? Some associated fatigue     Random glucose in clinic 106. Last meal at 7.30 am    Review of Systems   Constitutional:  Positive for fatigue. Negative for activity change, appetite change, chills, diaphoresis, fever and unexpected weight change. HENT:  Negative for rhinorrhea and sore throat. Respiratory:  Negative for cough and shortness of breath. Cardiovascular:  Negative for chest pain and leg swelling. Gastrointestinal:  Negative for abdominal pain, constipation, diarrhea, nausea and vomiting. Endocrine: Negative for polydipsia, polyphagia and polyuria. Genitourinary: Negative. Neurological:  Negative for syncope and light-headedness. Psychiatric/Behavioral:  Positive for sleep disturbance (gets up at 2am, urinates. Then has difficulty going back to sleep. Does not snore. No excessive daytime sleepiness. Feels refreshed in the AM). Past Medical History:   Diagnosis Date    ADD (attention deficit disorder)     Anxiety     Hematuria 2021     Past Surgical History:   Procedure Laterality Date    COLONOSCOPY      COLONOSCOPY N/A 2021    COLONOSCOPY WITH BIOPSY performed by Calixto Mendoza MD at [facility] ENDOSCOPY     Current Outpatient Medications   Medication Sig Dispense Refill    sildenafil (VIAGRA) 100 MG tablet TAKE 1/2 TO ONE TABLET BY MOUTH ONCE DAILY AS NEEDED 30 tablet 1     No current facility-administered medications for this visit.      BP (!) 148/80 (Site: Left Upper Arm, Position: Sitting)   Ht 6' 3\" (1.905 m)   Wt 205 lb (93 kg)   BMI 25.62 kg/m²     Physical Exam   Physical Exam  Vitals and nursing note reviewed. Constitutional:       General: He is not in acute distress. Appearance: Normal appearance. He is well-developed and normal weight. He is not ill-appearing, toxic-appearing or diaphoretic. HENT:      Head: Normocephalic and atraumatic. Right Ear: External ear normal.      Left Ear: External ear normal.      Nose:      Comments: Nasal mucosa is moderately swollen and mildly inflamed     Mouth/Throat:      Mouth: Mucous membranes are moist.   Eyes:      General: No scleral icterus. Pupils: Pupils are equal, round, and reactive to light. Neck:      Vascular: No carotid bruit or JVD. Cardiovascular:      Rate and Rhythm: Normal rate and regular rhythm. Heart sounds: Normal heart sounds. No murmur heard. No friction rub. No gallop. Pulmonary:      Effort: Pulmonary effort is normal. No respiratory distress. Breath sounds: Normal breath sounds. No wheezing or rales. Abdominal:      General: Bowel sounds are normal. There is no distension. Palpations: Abdomen is soft. Tenderness: There is no abdominal tenderness. There is no guarding. Musculoskeletal:         General: No swelling or tenderness. Normal range of motion. Cervical back: Normal range of motion and neck supple. Lymphadenopathy:      Cervical: No cervical adenopathy. Skin:     General: Skin is warm and dry. Findings: No erythema, lesion or rash. Neurological:      Mental Status: He is alert and oriented to person, place, and time. Cranial Nerves: No cranial nerve deficit. Gait: Gait normal.     Assessment/ plan:     Karen Jean was seen today for hypoglycemia. Diagnoses and all orders for this visit:    ? Extreme fatigue and decreased cognition  Concern for Hypoglycemia  -     POCT Glucose: 107.  Last meal around 7.30 am. Eats fairly healthy   - Discussed about reducing caffeine intake to less than 2 cups of coffee a day  - Advised regular exercise   - Recommended to obtain POC glucose monitor and check sugar if he has recurrence of the above sx    Elevated BP without diagnosis of hypertension   - 2 elevated BP readings in the clinic   - had 2 cups of coffee today AM   - Recommended ambulatory BP monitoring. If continues to stay elevated will need tx    Excessive caffeine intake   - counseled on reducing caffeine to < 2 cups a day    Hereditary hemochromatosis ([address]   - Used to see Dr April Dillon, now transferring care to Dr Reginald Mcduffie   - Last ferritin 279.3 (01/04/2023) < 471 (12/2022) < 569 (11/2022)   - s/p serial blood donation. Blood donation every 4 weeks x 4, then every 6 weeks. Target Ferritin under 60    Return in about 1 month (around 2/12/2023) for elevated blood pressure.     Zachary Turner MD

## 2023-02-06 ENCOUNTER — OFFICE VISIT (OUTPATIENT)
Dept: INTERNAL MEDICINE CLINIC | Age: 58
End: 2023-02-06
Payer: COMMERCIAL

## 2023-02-06 VITALS
BODY MASS INDEX: 25.61 KG/M2 | WEIGHT: 206 LBS | SYSTOLIC BLOOD PRESSURE: 144 MMHG | DIASTOLIC BLOOD PRESSURE: 79 MMHG | HEIGHT: 75 IN

## 2023-02-06 DIAGNOSIS — I10 ESSENTIAL HYPERTENSION: Primary | ICD-10-CM

## 2023-02-06 PROCEDURE — 3078F DIAST BP <80 MM HG: CPT | Performed by: HOSPITALIST

## 2023-02-06 PROCEDURE — 3077F SYST BP >= 140 MM HG: CPT | Performed by: HOSPITALIST

## 2023-02-06 PROCEDURE — 99213 OFFICE O/P EST LOW 20 MIN: CPT | Performed by: HOSPITALIST

## 2023-02-06 RX ORDER — LOSARTAN POTASSIUM 25 MG/1
25 TABLET ORAL DAILY
Qty: 30 TABLET | Refills: 5 | Status: SHIPPED | OUTPATIENT
Start: 2023-02-06

## 2023-02-06 SDOH — ECONOMIC STABILITY: FOOD INSECURITY: WITHIN THE PAST 12 MONTHS, THE FOOD YOU BOUGHT JUST DIDN'T LAST AND YOU DIDN'T HAVE MONEY TO GET MORE.: NEVER TRUE

## 2023-02-06 SDOH — ECONOMIC STABILITY: HOUSING INSECURITY
IN THE LAST 12 MONTHS, WAS THERE A TIME WHEN YOU DID NOT HAVE A STEADY PLACE TO SLEEP OR SLEPT IN A SHELTER (INCLUDING NOW)?: NO

## 2023-02-06 SDOH — ECONOMIC STABILITY: FOOD INSECURITY: WITHIN THE PAST 12 MONTHS, YOU WORRIED THAT YOUR FOOD WOULD RUN OUT BEFORE YOU GOT MONEY TO BUY MORE.: NEVER TRUE

## 2023-02-06 SDOH — ECONOMIC STABILITY: INCOME INSECURITY: HOW HARD IS IT FOR YOU TO PAY FOR THE VERY BASICS LIKE FOOD, HOUSING, MEDICAL CARE, AND HEATING?: NOT HARD AT ALL

## 2023-02-06 NOTE — PROGRESS NOTES
Follow Up Visit Established Patient Visit    Patient:  Geovanna Vann                                               : 1965  Age: 59 y.o. MRN: [id number]  Date : 2023    Geovanna Vann is a 59 y.o. male who presents for : Follow-up on elevated blood pressure    Chief Complaint   Patient presents with    Follow-up     Blood pressure     Checks his BP 2-3 times/ week; SBP ranges 133-145 mm Hg. Exercises regularly. Feels better overall. Chronic fatigue is improving. Past Medical History:   Diagnosis Date    ADD (attention deficit disorder)     Anxiety     Hematuria 2021       Past Surgical History:   Procedure Laterality Date    COLONOSCOPY      COLONOSCOPY N/A 2021    COLONOSCOPY WITH BIOPSY performed by Juana Gandara MD at [facility] ENDOSCOPY       Current Outpatient Medications   Medication Sig Dispense Refill    losartan (COZAAR) 25 MG tablet Take 1 tablet by mouth daily 30 tablet 5    sildenafil (VIAGRA) 100 MG tablet TAKE 1/2 TO ONE TABLET BY MOUTH ONCE DAILY AS NEEDED (Patient not taking: Reported on 2023) 30 tablet 1     No current facility-administered medications for this visit. BP (!) 144/79   Ht 6' 3\" (1.905 m)   Wt 206 lb (93.4 kg)   BMI 25.75 kg/m²     Physical Exam   Physical Exam  Vitals and nursing note reviewed. Constitutional:       General: He is not in acute distress. Appearance: He is well-developed. HENT:      Head: Normocephalic and atraumatic. Right Ear: Tympanic membrane, ear canal and external ear normal. There is no impacted cerumen. Left Ear: Tympanic membrane, ear canal and external ear normal. There is no impacted cerumen. Nose:      Comments: Nasal mucosa is moderately swollen and mildly inflamed     Mouth/Throat:      Mouth: Mucous membranes are moist.      Pharynx: Oropharynx is clear. No oropharyngeal exudate or posterior oropharyngeal erythema. Eyes:      General: No scleral icterus.      Extraocular Movements: Extraocular movements intact. Conjunctiva/sclera: Conjunctivae normal.      Pupils: Pupils are equal, round, and reactive to light. Neck:      Vascular: No carotid bruit or JVD. Cardiovascular:      Rate and Rhythm: Normal rate and regular rhythm. Heart sounds: Normal heart sounds. No murmur heard. No friction rub. No gallop. Pulmonary:      Effort: Pulmonary effort is normal. No respiratory distress. Breath sounds: Normal breath sounds. No wheezing or rales. Abdominal:      General: Bowel sounds are normal. There is no distension. Palpations: Abdomen is soft. Tenderness: There is no abdominal tenderness. There is no right CVA tenderness or left CVA tenderness. Musculoskeletal:         General: No tenderness. Normal range of motion. Cervical back: Normal range of motion and neck supple. Right lower leg: No edema. Left lower leg: No edema. Lymphadenopathy:      Cervical: No cervical adenopathy. Skin:     General: Skin is warm and dry. Findings: No erythema, lesion or rash. Neurological:      Mental Status: He is alert and oriented to person, place, and time. Cranial Nerves: No cranial nerve deficit. Gait: Gait normal.      Deep Tendon Reflexes: Reflexes normal.       Assessment/ plan:     Caden Pringle was seen today for follow-up. Diagnoses and all orders for this visit:    Essential hypertension  -     losartan (COZAAR) 25 MG tablet; Take 1 tablet by mouth daily  -     Continue with regular exercise and adherence to low-sodium diet      Return in about 1 month (around 3/6/2023) for HTN.     Jesse Kaba MD

## 2023-03-08 ENCOUNTER — TELEPHONE (OUTPATIENT)
Dept: INTERNAL MEDICINE CLINIC | Age: 58
End: 2023-03-08

## 2023-03-08 NOTE — TELEPHONE ENCOUNTER
Pt called in and said that he has tested positive for COVID and has read that he should be watching out long COVID. He has read a couple of ways (antivirals) to help britt off [facility]. He would like your opinion on ways to help him not get long COVID.   Please call and advise

## 2023-04-28 ENCOUNTER — TELEPHONE (OUTPATIENT)
Dept: INTERNAL MEDICINE CLINIC | Age: 58
End: 2023-04-28

## 2023-04-28 DIAGNOSIS — E83.110 HEREDITARY HEMOCHROMATOSIS (HCC): Primary | ICD-10-CM

## 2023-05-09 ENCOUNTER — TELEPHONE (OUTPATIENT)
Dept: INTERNAL MEDICINE CLINIC | Age: 58
End: 2023-05-09

## 2023-05-09 DIAGNOSIS — E16.2 HYPOGLYCEMIA: Primary | ICD-10-CM

## 2023-05-09 NOTE — TELEPHONE ENCOUNTER
----- Message from Marianela Ober sent at 5/9/2023  9:28 AM EDT -----  Subject: Message to Provider    QUESTIONS  Information for Provider? pt would like to have his blood sugar tested   added to his lab work order. he experienced low sugar over the weekend. please contact pt.   ---------------------------------------------------------------------------  --------------  Bacilio HOPE  [phone]; OK to leave message on voicemail  ---------------------------------------------------------------------------  --------------  SCRIPT ANSWERS  Relationship to Patient?  Self

## 2023-05-11 DIAGNOSIS — E83.110 HEREDITARY HEMOCHROMATOSIS (HCC): ICD-10-CM

## 2023-05-11 LAB
FERRITIN SERPL IA-MCNC: 272.6 NG/ML (ref 30–400)
IRON SATN MFR SERPL: 80 % (ref 20–50)
IRON SERPL-MCNC: 230 UG/DL (ref 59–158)
TIBC SERPL-MCNC: 287 UG/DL (ref 260–445)

## 2023-05-12 ENCOUNTER — PATIENT MESSAGE (OUTPATIENT)
Dept: INTERNAL MEDICINE CLINIC | Age: 58
End: 2023-05-12

## 2023-05-15 NOTE — TELEPHONE ENCOUNTER
From: Billy Mejias  To: Dr. Terell Clifford  Sent: 5/12/2023 8:38 AM EDT  Subject: Recent iron tests    Helhubert Parrish, I had a ferritin and iron test this week. I wonder if the results could be sent over to Dr. Blanquita Gunter?     Liliana [facility]  [phone]

## 2023-05-22 LAB
ALBUMIN SERPL-MCNC: 4.3 G/DL (ref 3.4–5)
ALBUMIN/GLOB SERPL: 2.2 {RATIO} (ref 1.1–2.2)
ALP SERPL-CCNC: 63 U/L (ref 40–129)
ALT SERPL-CCNC: 17 U/L (ref 10–40)
ANION GAP SERPL CALCULATED.3IONS-SCNC: 8 MMOL/L (ref 3–16)
AST SERPL-CCNC: 16 U/L (ref 15–37)
BILIRUB SERPL-MCNC: 0.4 MG/DL (ref 0–1)
BUN SERPL-MCNC: 15 MG/DL (ref 7–20)
CALCIUM SERPL-MCNC: 9.4 MG/DL (ref 8.3–10.6)
CHLORIDE SERPL-SCNC: 104 MMOL/L (ref 99–110)
CO2 SERPL-SCNC: 27 MMOL/L (ref 21–32)
CREAT SERPL-MCNC: 1 MG/DL (ref 0.9–1.3)
GFR SERPLBLD CREATININE-BSD FMLA CKD-EPI: >60 ML/MIN/{1.73_M2}
GLUCOSE SERPL-MCNC: 105 MG/DL (ref 70–99)
POTASSIUM SERPL-SCNC: 4.9 MMOL/L (ref 3.5–5.1)
PROT SERPL-MCNC: 6.3 G/DL (ref 6.4–8.2)
SODIUM SERPL-SCNC: 139 MMOL/L (ref 136–145)

## 2023-05-23 ENCOUNTER — TELEPHONE (OUTPATIENT)
Dept: INTERNAL MEDICINE CLINIC | Age: 58
End: 2023-05-23

## 2023-05-23 NOTE — TELEPHONE ENCOUNTER
CPM test results   (Newest Message First)  Priyank Abel [facility] [place] [address] Practice Support (supporting Selina Maharaj MD) 15 hours ago (5:20 PM)     Rakesh Pena, I just looked at the [facility] test results. Looks pretty good except the glucose was high and the Protane was low. I have been feeling tired lately and a couple weekends ago I was working in the yard and got dizzy, and had to sit down, once that happened, I tested my blood sugar and it was low. I will be in to see you for an appointment in about three weeks. Thanks.

## 2023-07-26 ENCOUNTER — TELEPHONE (OUTPATIENT)
Dept: INTERNAL MEDICINE CLINIC | Age: 58
End: 2023-07-26

## 2023-07-26 DIAGNOSIS — E83.110 HEREDITARY HEMOCHROMATOSIS (HCC): Primary | ICD-10-CM

## 2023-07-26 NOTE — TELEPHONE ENCOUNTER
Pt is requesting lab orders be placed to have iron and ferritin levels check due to concern of recent diagnosis of Hemochromatosis.     Please advise

## 2023-08-03 DIAGNOSIS — E83.110 HEREDITARY HEMOCHROMATOSIS (HCC): ICD-10-CM

## 2023-08-03 LAB
FERRITIN SERPL IA-MCNC: 247.7 NG/ML (ref 30–400)
IRON SATN MFR SERPL: 87 % (ref 20–50)
IRON SERPL-MCNC: 233 UG/DL (ref 59–158)
TIBC SERPL-MCNC: 268 UG/DL (ref 260–445)

## 2023-08-10 ENCOUNTER — TELEPHONE (OUTPATIENT)
Dept: INTERNAL MEDICINE CLINIC | Age: 58
End: 2023-08-10

## 2023-08-10 DIAGNOSIS — E83.110 HEREDITARY HEMOCHROMATOSIS (HCC): Primary | ICD-10-CM

## 2023-08-10 NOTE — TELEPHONE ENCOUNTER
----- Message from Ant Lee sent at 8/9/2023  4:30 PM EDT -----  Subject: Referral Request    Reason for referral request? The patient called on 8/9/2023. He is   requesting orders to have blood work done. He stated it is the same as   what he had done a month ago. please contact the patient when the orders   are ready   Provider patient wants to be referred to(if known):     Provider Phone Number(if known):     Additional Information for Provider?   ---------------------------------------------------------------------------  --------------  [address]    [phone]; OK to leave message on voicemail  ---------------------------------------------------------------------------  --------------

## 2023-08-12 ENCOUNTER — PATIENT MESSAGE (OUTPATIENT)
Dept: INTERNAL MEDICINE CLINIC | Age: 58
End: 2023-08-12

## 2023-08-14 ENCOUNTER — TELEPHONE (OUTPATIENT)
Dept: INTERNAL MEDICINE CLINIC | Age: 58
End: 2023-08-14

## 2023-08-14 NOTE — TELEPHONE ENCOUNTER
Timeline for donations and tests  (Newest Message First)  Soni Hernandez [facility] Support (supporting Rufina Vogel MD) 2 days ago     JS  5/11/23:  272     6/7/23: Blood donation     8/3/23:  247     8/5/23:  Blood donation     8/12/23:  192        Looks like the blood donations are working. My thought is to donate every two months. Any comments/suggestions?      Thanks,     Sherry Roland

## 2023-10-23 ENCOUNTER — OFFICE VISIT (OUTPATIENT)
Dept: FAMILY MEDICINE CLINIC | Age: 58
End: 2023-10-23
Payer: COMMERCIAL

## 2023-10-23 VITALS
BODY MASS INDEX: 25.17 KG/M2 | DIASTOLIC BLOOD PRESSURE: 64 MMHG | WEIGHT: 202.4 LBS | HEIGHT: 75 IN | OXYGEN SATURATION: 99 % | HEART RATE: 67 BPM | SYSTOLIC BLOOD PRESSURE: 110 MMHG

## 2023-10-23 DIAGNOSIS — L03.011 PARONYCHIA OF FINGER OF RIGHT HAND: Primary | ICD-10-CM

## 2023-10-23 PROCEDURE — 10060 I&D ABSCESS SIMPLE/SINGLE: CPT | Performed by: NURSE PRACTITIONER

## 2023-10-23 ASSESSMENT — ENCOUNTER SYMPTOMS
ABDOMINAL PAIN: 0
COLOR CHANGE: 0
DIARRHEA: 0
COUGH: 0
EYE ITCHING: 0
WHEEZING: 0
BLOOD IN STOOL: 0
CHEST TIGHTNESS: 0
SORE THROAT: 0
SHORTNESS OF BREATH: 0
NAUSEA: 0
CONSTIPATION: 0
SINUS PRESSURE: 0
BACK PAIN: 0
EYE REDNESS: 0
RHINORRHEA: 0
VOMITING: 0

## 2023-10-23 NOTE — PROGRESS NOTES
Jada Collier (:  1965) is a 59 y.o. male,Established patient, here for evaluation of the following chief complaint(s):  Follow-up (Infection in the index finger around the cuticle  )      ASSESSMENT/PLAN:  1. Paronychia of finger of right hand  Assessment & Plan:   Incision and Drainage Procedure Note - :    Indications: Paronychia with abscess    Chaperone/MA/RN present during entirety of procedure. Risks of surgery explained, including bleeding, infection, need for additional procedures, pain, and damage to nearby structures. Patient verbally consented to proceed. Manuela Peterson was prepped and draped in sterile fashion using betadine prep solution. Freeze spray was used to anesthetize tissue. 11 blade scalpel used to sharply incise a cruciate at the area of maximal fluctuance. Corners of cruciate excised. Wound explored using hemostat to ensure all purulence drained. Hemostasis obtained using direct pressure. Wound dimensions: 0.5x0.5  Findings: Purulent drainage  Dressing: Dry  Complications: none  EBL: 5 cc    Plan: No need to pack wound. Continue twice daily showers and dry guaze over wound. Call office or come to ED if worsening redness, swelling, fevers, chills, or any other concerns. Follow-up: 2 weeks for wound check    Orders:  -     Culture, Wound Aerobic Only      No follow-ups on file. SUBJECTIVE/OBJECTIVE:    Patient the office today with a possible infection to his right index finger. He stated the other day he noticed swelling and pus accumulate at the base of his nailbed. He has not tried any treatment other than putting a topical bacitracin ointment on it. He states that the pain continues to be increasing and throbbing. He states when he hits it on something the pain is more severe. Denies any fevers or previous history of infections or issues with this.   Current Outpatient Medications   Medication Sig Dispense Refill    mupirocin (BACTROBAN) 2 % ointment Apply topically 3

## 2023-10-23 NOTE — ASSESSMENT & PLAN NOTE
Incision and Drainage Procedure Note - :    Indications: Paronychia with abscess    Chaperone/MA/RN present during entirety of procedure. Risks of surgery explained, including bleeding, infection, need for additional procedures, pain, and damage to nearby structures. Patient verbally consented to proceed. Mai Stratton was prepped and draped in sterile fashion using betadine prep solution. Freeze spray was used to anesthetize tissue. 11 blade scalpel used to sharply incise a cruciate at the area of maximal fluctuance. Corners of cruciate excised. Wound explored using hemostat to ensure all purulence drained. Hemostasis obtained using direct pressure. Wound dimensions: 0.5x0.5  Findings: Purulent drainage  Dressing: Dry  Complications: none  EBL: 5 cc    Plan: No need to pack wound. Continue twice daily showers and dry guaze over wound. Call office or come to ED if worsening redness, swelling, fevers, chills, or any other concerns.     Follow-up: 2 weeks for wound check

## 2023-10-24 ENCOUNTER — TELEPHONE (OUTPATIENT)
Dept: INTERNAL MEDICINE CLINIC | Age: 58
End: 2023-10-24

## 2023-10-24 DIAGNOSIS — E83.110 HEREDITARY HEMOCHROMATOSIS (HCC): Primary | ICD-10-CM

## 2023-10-24 NOTE — TELEPHONE ENCOUNTER
----- Message from [address] [facility] sent at 10/24/2023  2:54 PM EDT -----  Subject: Referral Request    Reason for referral request? routine labs  Provider patient wants to be referred to(if known):     Provider Phone Number(if known): Additional Information for Provider?  Sana Sanchez would like a lab order the   same labs that were drawn on 8/12/2023  ---------------------------------------------------------------------------  --------------  [address]    [phone]; OK to leave message on voicemail  ---------------------------------------------------------------------------  --------------

## 2023-10-27 DIAGNOSIS — E83.110 HEREDITARY HEMOCHROMATOSIS (HCC): ICD-10-CM

## 2023-10-28 LAB
BACTERIA SPEC AEROBE CULT: ABNORMAL
BACTERIA SPEC AEROBE CULT: ABNORMAL
GRAM STN SPEC: ABNORMAL
ORGANISM: ABNORMAL

## 2023-10-30 RX ORDER — SULFAMETHOXAZOLE AND TRIMETHOPRIM 800; 160 MG/1; MG/1
1 TABLET ORAL 2 TIMES DAILY
Qty: 14 TABLET | Refills: 0 | Status: SHIPPED | OUTPATIENT
Start: 2023-10-30 | End: 2023-11-06

## 2023-11-03 LAB
FERRITIN SERPL IA-MCNC: 275.2 NG/ML (ref 30–400)
IRON SATN MFR SERPL: 93 % (ref 20–50)
IRON SERPL-MCNC: 268 UG/DL (ref 59–158)
TIBC SERPL-MCNC: 288 UG/DL (ref 260–445)

## 2023-11-08 DIAGNOSIS — E83.110 HEREDITARY HEMOCHROMATOSIS (HCC): ICD-10-CM

## 2023-11-08 DIAGNOSIS — E83.110 HEREDITARY HEMOCHROMATOSIS (HCC): Primary | ICD-10-CM

## 2023-11-08 LAB
FERRITIN SERPL IA-MCNC: 225.1 NG/ML (ref 30–400)
IRON SATN MFR SERPL: 68 % (ref 20–50)
IRON SERPL-MCNC: 199 UG/DL (ref 59–158)
TIBC SERPL-MCNC: 294 UG/DL (ref 260–445)

## 2023-11-10 ENCOUNTER — TELEPHONE (OUTPATIENT)
Dept: INTERNAL MEDICINE CLINIC | Age: 58
End: 2023-11-10

## 2023-11-10 DIAGNOSIS — L03.011 PARONYCHIA OF FINGER OF RIGHT HAND: Primary | ICD-10-CM

## 2023-11-10 NOTE — TELEPHONE ENCOUNTER
Patient states he just finished the last antibiotic for his staph infection and the finger is still red and is asking what he should do or if Dr. Keisha Johnston needs to take a look at it.     Please call and advise [phone]

## 2023-11-10 NOTE — TELEPHONE ENCOUNTER
Warm salt water soak and refer to hand specialist per Dr. Ananth Jim       Left voicemail for patient to call back

## 2024-01-09 ENCOUNTER — HOSPITAL ENCOUNTER (OUTPATIENT)
Dept: ULTRASOUND IMAGING | Age: 59
Discharge: HOME | End: 2024-01-09
Attending: UROLOGY
Payer: COMMERCIAL

## 2024-01-09 ENCOUNTER — TELEPHONE (OUTPATIENT)
Dept: INTERNAL MEDICINE CLINIC | Age: 59
End: 2024-01-09

## 2024-01-09 DIAGNOSIS — Z87.442 PERSONAL HISTORY OF URINARY CALCULI: ICD-10-CM

## 2024-01-09 DIAGNOSIS — R39.12 POOR URINARY STREAM: ICD-10-CM

## 2024-01-09 DIAGNOSIS — N40.1 BENIGN PROSTATIC HYPERPLASIA WITH LOWER URINARY TRACT SYMPTOMS, SYMPTOM DETAILS UNSPECIFIED: ICD-10-CM

## 2024-01-09 DIAGNOSIS — R35.0 FREQUENCY OF MICTURITION: ICD-10-CM

## 2024-01-09 DIAGNOSIS — N52.02 CORPORO-VENOUS OCCLUSIVE ERECTILE DYSFUNCTION: ICD-10-CM

## 2024-01-09 PROCEDURE — 76770 US EXAM ABDO BACK WALL COMP: CPT

## 2024-01-09 NOTE — TELEPHONE ENCOUNTER
Patient is us to send [facility] a letter saying it is okay for him to donate blood.    [facility]  FAX: [phone]    NOTE:  he has done this before.  Needs a new letter every year...

## 2024-01-26 DIAGNOSIS — E83.110 HEREDITARY HEMOCHROMATOSIS (HCC): ICD-10-CM

## 2024-01-27 LAB
FERRITIN SERPL IA-MCNC: 238.7 NG/ML (ref 30–400)
IRON SATN MFR SERPL: 65 % (ref 20–50)
IRON SERPL-MCNC: 199 UG/DL (ref 59–158)
TIBC SERPL-MCNC: 305 UG/DL (ref 260–445)

## 2024-02-06 DIAGNOSIS — E83.110 HEREDITARY HEMOCHROMATOSIS (HCC): ICD-10-CM

## 2024-02-06 LAB
FERRITIN SERPL IA-MCNC: 166.8 NG/ML (ref 30–400)
IRON SATN MFR SERPL: 69 % (ref 20–50)
IRON SERPL-MCNC: 197 UG/DL (ref 59–158)
TIBC SERPL-MCNC: 286 UG/DL (ref 260–445)

## 2024-04-18 DIAGNOSIS — E83.110 HEREDITARY HEMOCHROMATOSIS (HCC): ICD-10-CM

## 2024-04-18 LAB
FERRITIN SERPL IA-MCNC: 194.3 NG/ML (ref 30–400)
IRON SATN MFR SERPL: 66 % (ref 20–50)
IRON SERPL-MCNC: 200 UG/DL (ref 59–158)
TIBC SERPL-MCNC: 303 UG/DL (ref 260–445)

## 2024-05-29 ENCOUNTER — TELEPHONE (OUTPATIENT)
Dept: INTERNAL MEDICINE CLINIC | Age: 59
End: 2024-05-29

## 2024-05-29 NOTE — TELEPHONE ENCOUNTER
Pt states he needs a letter clearing him to donate blood to [facility] at fax# [phone].

## 2024-05-30 ENCOUNTER — NURSE ONLY (OUTPATIENT)
Dept: INTERNAL MEDICINE CLINIC | Age: 59
End: 2024-05-30
Payer: COMMERCIAL

## 2024-05-30 DIAGNOSIS — Z23 NEED FOR SHINGLES VACCINE: Primary | ICD-10-CM

## 2024-05-30 PROCEDURE — 90471 IMMUNIZATION ADMIN: CPT | Performed by: HOSPITALIST

## 2024-05-30 PROCEDURE — 90750 HZV VACC RECOMBINANT IM: CPT | Performed by: HOSPITALIST

## 2024-05-30 SDOH — ECONOMIC STABILITY: FOOD INSECURITY: WITHIN THE PAST 12 MONTHS, THE FOOD YOU BOUGHT JUST DIDN'T LAST AND YOU DIDN'T HAVE MONEY TO GET MORE.: NEVER TRUE

## 2024-05-30 SDOH — ECONOMIC STABILITY: FOOD INSECURITY: WITHIN THE PAST 12 MONTHS, YOU WORRIED THAT YOUR FOOD WOULD RUN OUT BEFORE YOU GOT MONEY TO BUY MORE.: NEVER TRUE

## 2024-05-30 SDOH — ECONOMIC STABILITY: INCOME INSECURITY: HOW HARD IS IT FOR YOU TO PAY FOR THE VERY BASICS LIKE FOOD, HOUSING, MEDICAL CARE, AND HEATING?: NOT HARD AT ALL

## 2024-05-30 ASSESSMENT — PATIENT HEALTH QUESTIONNAIRE - PHQ9
SUM OF ALL RESPONSES TO PHQ QUESTIONS 1-9: 0
2. FEELING DOWN, DEPRESSED OR HOPELESS: NOT AT ALL
SUM OF ALL RESPONSES TO PHQ QUESTIONS 1-9: 0
SUM OF ALL RESPONSES TO PHQ QUESTIONS 1-9: 0
1. LITTLE INTEREST OR PLEASURE IN DOING THINGS: NOT AT ALL
SUM OF ALL RESPONSES TO PHQ9 QUESTIONS 1 & 2: 0
SUM OF ALL RESPONSES TO PHQ QUESTIONS 1-9: 0

## 2024-06-07 ENCOUNTER — TELEPHONE (OUTPATIENT)
Dept: INTERNAL MEDICINE CLINIC | Age: 59
End: 2024-06-07

## 2024-06-07 NOTE — TELEPHONE ENCOUNTER
[facility] letter needs to say that pt is allowed to donate blood once every 3 months. PLease correct and refax to [facility] at [phone].

## 2024-06-10 NOTE — TELEPHONE ENCOUNTER
This was submitted to PA department. This is not something we address.     If this requires a response please respond to the pool ( P [facility] MEDICATION PRE-AUTH).      Thank you

## 2024-06-19 ENCOUNTER — OFFICE VISIT (OUTPATIENT)
Dept: INTERNAL MEDICINE CLINIC | Age: 59
End: 2024-06-19
Payer: COMMERCIAL

## 2024-06-19 VITALS
DIASTOLIC BLOOD PRESSURE: 70 MMHG | BODY MASS INDEX: 25.36 KG/M2 | WEIGHT: 204 LBS | HEIGHT: 75 IN | SYSTOLIC BLOOD PRESSURE: 128 MMHG

## 2024-06-19 DIAGNOSIS — E83.110 HEREDITARY HEMOCHROMATOSIS (HCC): ICD-10-CM

## 2024-06-19 DIAGNOSIS — Z11.4 SCREENING FOR HUMAN IMMUNODEFICIENCY VIRUS WITHOUT PRESENCE OF RISK FACTORS: ICD-10-CM

## 2024-06-19 DIAGNOSIS — N52.9 ERECTILE DYSFUNCTION, UNSPECIFIED ERECTILE DYSFUNCTION TYPE: ICD-10-CM

## 2024-06-19 DIAGNOSIS — Z12.5 PROSTATE CANCER SCREENING: ICD-10-CM

## 2024-06-19 DIAGNOSIS — Z00.00 ENCOUNTER FOR WELL ADULT EXAM WITHOUT ABNORMAL FINDINGS: Primary | ICD-10-CM

## 2024-06-19 DIAGNOSIS — I10 ESSENTIAL HYPERTENSION: ICD-10-CM

## 2024-06-19 DIAGNOSIS — Z00.00 ENCOUNTER FOR WELL ADULT EXAM WITHOUT ABNORMAL FINDINGS: ICD-10-CM

## 2024-06-19 DIAGNOSIS — Z11.59 ENCOUNTER FOR HEPATITIS C SCREENING TEST FOR LOW RISK PATIENT: ICD-10-CM

## 2024-06-19 DIAGNOSIS — Z71.89 ACP (ADVANCE CARE PLANNING): ICD-10-CM

## 2024-06-19 LAB
25(OH)D3 SERPL-MCNC: 31.3 NG/ML
ALBUMIN SERPL-MCNC: 4.3 G/DL (ref 3.4–5)
ALBUMIN/GLOB SERPL: 2.3 {RATIO} (ref 1.1–2.2)
ALP SERPL-CCNC: 58 U/L (ref 40–129)
ALT SERPL-CCNC: 19 U/L (ref 10–40)
ANION GAP SERPL CALCULATED.3IONS-SCNC: 11 MMOL/L (ref 3–16)
AST SERPL-CCNC: 17 U/L (ref 15–37)
BASOPHILS # BLD: 0 K/UL (ref 0–0.2)
BASOPHILS NFR BLD: 0.5 %
BILIRUB SERPL-MCNC: 0.6 MG/DL (ref 0–1)
BUN SERPL-MCNC: 16 MG/DL (ref 7–20)
CALCIUM SERPL-MCNC: 9.6 MG/DL (ref 8.3–10.6)
CHLORIDE SERPL-SCNC: 101 MMOL/L (ref 99–110)
CHOLEST SERPL-MCNC: 211 MG/DL (ref 0–199)
CO2 SERPL-SCNC: 25 MMOL/L (ref 21–32)
CREAT SERPL-MCNC: 0.9 MG/DL (ref 0.8–1.3)
DEPRECATED RDW RBC AUTO: 13.1 % (ref 12.4–15.4)
EOSINOPHIL # BLD: 0.1 K/UL (ref 0–0.6)
EOSINOPHIL NFR BLD: 1.6 %
FERRITIN SERPL IA-MCNC: 237.3 NG/ML (ref 30–400)
GFR SERPLBLD CREATININE-BSD FMLA CKD-EPI: >90 ML/MIN/{1.73_M2}
GLUCOSE SERPL-MCNC: 99 MG/DL (ref 70–99)
HCT VFR BLD AUTO: 43.7 % (ref 40.5–52.5)
HCV AB SERPL QL IA: NORMAL
HDLC SERPL-MCNC: 46 MG/DL (ref 40–60)
HGB BLD-MCNC: 15.2 G/DL (ref 13.5–17.5)
IRON SATN MFR SERPL: ABNORMAL % (ref 20–50)
IRON SERPL-MCNC: 275 UG/DL (ref 59–158)
LDLC SERPL CALC-MCNC: 147 MG/DL
LYMPHOCYTES # BLD: 1.6 K/UL (ref 1–5.1)
LYMPHOCYTES NFR BLD: 32.4 %
MCH RBC QN AUTO: 32.4 PG (ref 26–34)
MCHC RBC AUTO-ENTMCNC: 34.8 G/DL (ref 31–36)
MCV RBC AUTO: 93.3 FL (ref 80–100)
MONOCYTES # BLD: 0.5 K/UL (ref 0–1.3)
MONOCYTES NFR BLD: 9.8 %
NEUTROPHILS # BLD: 2.8 K/UL (ref 1.7–7.7)
NEUTROPHILS NFR BLD: 55.7 %
PLATELET # BLD AUTO: 218 K/UL (ref 135–450)
PMV BLD AUTO: 10.1 FL (ref 5–10.5)
POTASSIUM SERPL-SCNC: 4.6 MMOL/L (ref 3.5–5.1)
PROT SERPL-MCNC: 6.2 G/DL (ref 6.4–8.2)
PSA SERPL DL<=0.01 NG/ML-MCNC: 2.69 NG/ML (ref 0–4)
RBC # BLD AUTO: 4.68 M/UL (ref 4.2–5.9)
SODIUM SERPL-SCNC: 137 MMOL/L (ref 136–145)
TIBC SERPL-MCNC: ABNORMAL UG/DL (ref 260–445)
TRIGL SERPL-MCNC: 88 MG/DL (ref 0–150)
TSH SERPL DL<=0.005 MIU/L-ACNC: 1.48 UIU/ML (ref 0.27–4.2)
VLDLC SERPL CALC-MCNC: 18 MG/DL
WBC # BLD AUTO: 5 K/UL (ref 4–11)

## 2024-06-19 PROCEDURE — 3074F SYST BP LT 130 MM HG: CPT | Performed by: HOSPITALIST

## 2024-06-19 PROCEDURE — 99396 PREV VISIT EST AGE 40-64: CPT | Performed by: HOSPITALIST

## 2024-06-19 PROCEDURE — 3078F DIAST BP <80 MM HG: CPT | Performed by: HOSPITALIST

## 2024-06-19 ASSESSMENT — ENCOUNTER SYMPTOMS
GASTROINTESTINAL NEGATIVE: 1
RESPIRATORY NEGATIVE: 1
EYES NEGATIVE: 1

## 2024-06-19 NOTE — PATIENT INSTRUCTIONS

## 2024-06-19 NOTE — PROGRESS NOTES
Cervical back: Normal range of motion and neck supple.      Right lower leg: No edema.      Left lower leg: No edema.   Lymphadenopathy:      Cervical: No cervical adenopathy.   Skin:     General: Skin is warm and dry.      Findings: No erythema, lesion or rash.   Neurological:      General: No focal deficit present.      Mental Status: He is alert and oriented to person, place, and time.      Cranial Nerves: No cranial nerve deficit.      Gait: Gait normal.      Deep Tendon Reflexes: Reflexes normal.   Psychiatric:         Mood and Affect: Mood normal.         Behavior: Behavior normal.         Assessment   Plan   1. Encounter for well adult exam without abnormal findings  -     CBC with Auto Differential; Future  -     Comprehensive Metabolic Panel; Future  -     Lipid Panel; Future  -     TSH; Future  -     Vitamin D 25 Hydroxy; Future  -     Continue to adhere to heart healthy diet and regular exercise    2. Essential hypertension  -     CBC with Auto Differential; Future  -     Comprehensive Metabolic Panel; Future  -     Lipid Panel; Future  -     TSH; Future  -     Controls with regular exercise and adherence to low-sodium diet    3. Hereditary hemochromatosis (HCC)        -     Will continue with phlebotomy treatments every 3 months    4. Erectile dysfunction, unspecified erectile dysfunction type        -     Continue sildenafil 50 mg orally daily as needed    5. Encounter for hepatitis C screening test for low risk patient  -     Hepatitis C Antibody; Future  6. Screening for human immunodeficiency virus without presence of risk factors  -     HIV Screen; Future  7. Prostate cancer screening  -     PSA Screening; Future    9. ACP (advance care planning)   Full code      Personalized Preventive Plan   Current Health Maintenance Status  Immunization History   Administered Date(s) Administered    COVID-19, PFIZER PURPLE top, DILUTE for use, (age 12 y+), 30mcg/0.3mL 05/07/2021, 05/29/2021, 12/12/2021

## 2024-06-20 ENCOUNTER — TELEPHONE (OUTPATIENT)
Dept: INTERNAL MEDICINE CLINIC | Age: 59
End: 2024-06-20

## 2024-06-20 LAB
HIV 1+2 AB+HIV1 P24 AG SERPL QL IA: NORMAL
HIV 2 AB SERPL QL IA: NORMAL
HIV1 AB SERPL QL IA: NORMAL
HIV1 P24 AG SERPL QL IA: NORMAL

## 2024-06-20 NOTE — TELEPHONE ENCOUNTER
[facility]ina Sharma - ph [phone]    Please call Marcelina and verify they received the fax from us.    They need additional information.  Example: doctor needs to include how often Mr. Vines can donate blood.  (See encounter dated 06/07/24).

## 2024-07-05 ENCOUNTER — TELEPHONE (OUTPATIENT)
Dept: INTERNAL MEDICINE CLINIC | Age: 59
End: 2024-07-05

## 2024-07-05 NOTE — TELEPHONE ENCOUNTER
Pt has been having a pinch feeling in his lower right side of his back and is asking if DR. Shore would like to do a urine sample.    [phone]

## 2024-07-05 NOTE — TELEPHONE ENCOUNTER
Ok     Heat and lidocaine patch, tylenol if not better call us back on Monday.    Patient has been notified of recommendations.

## 2024-08-19 DIAGNOSIS — N52.9 ERECTILE DYSFUNCTION, UNSPECIFIED ERECTILE DYSFUNCTION TYPE: ICD-10-CM

## 2024-08-19 NOTE — TELEPHONE ENCOUNTER
Pt is requesting a refill on medication       sildenafil (VIAGRA) 100 MG tablet [[id number]     [facility] [id number] - [place], [place] - [address] - P [phone] - F [phone]

## 2024-08-20 RX ORDER — SILDENAFIL 100 MG/1
TABLET, FILM COATED ORAL
Qty: 30 TABLET | Refills: 1 | Status: SHIPPED | OUTPATIENT
Start: 2024-08-20

## 2024-10-01 ENCOUNTER — TELEPHONE (OUTPATIENT)
Dept: INTERNAL MEDICINE CLINIC | Age: 59
End: 2024-10-01

## 2024-10-01 NOTE — TELEPHONE ENCOUNTER
Patient had light sedation 6 days ago and wants to know if that is okay with his upcoming Shingles vaccine.    Please call patient to advise: [phone]

## 2024-10-02 ENCOUNTER — NURSE ONLY (OUTPATIENT)
Dept: INTERNAL MEDICINE CLINIC | Age: 59
End: 2024-10-02
Payer: COMMERCIAL

## 2024-10-02 DIAGNOSIS — Z23 NEED FOR SHINGLES VACCINE: Primary | ICD-10-CM

## 2024-10-02 PROCEDURE — 90471 IMMUNIZATION ADMIN: CPT | Performed by: HOSPITALIST

## 2024-10-02 PROCEDURE — 90750 HZV VACC RECOMBINANT IM: CPT | Performed by: HOSPITALIST

## 2024-10-26 DIAGNOSIS — E83.110 HEREDITARY HEMOCHROMATOSIS (HCC): ICD-10-CM

## 2024-10-26 LAB
FERRITIN SERPL IA-MCNC: 233 NG/ML (ref 30–400)
IRON SATN MFR SERPL: 60 % (ref 20–50)
IRON SERPL-MCNC: 175 UG/DL (ref 59–158)
TIBC SERPL-MCNC: 290 UG/DL (ref 260–445)

## 2024-10-29 ENCOUNTER — TELEPHONE (OUTPATIENT)
Dept: INTERNAL MEDICINE CLINIC | Age: 59
End: 2024-10-29

## 2024-10-29 DIAGNOSIS — E83.110 HEREDITARY HEMOCHROMATOSIS (HCC): Primary | ICD-10-CM

## 2024-10-29 NOTE — TELEPHONE ENCOUNTER
Pt states he needs Lab for Iron, TIBC and Ferritin and is asking for lab work to be put continuously. Doesn't want to keep calling.    [phone]  Pls call and advise

## 2024-11-09 DIAGNOSIS — E83.110 HEREDITARY HEMOCHROMATOSIS (HCC): ICD-10-CM

## 2024-11-09 LAB
FERRITIN SERPL IA-MCNC: 195 NG/ML (ref 30–400)
IRON SATN MFR SERPL: 67 % (ref 20–50)
IRON SERPL-MCNC: 198 UG/DL (ref 59–158)
TIBC SERPL-MCNC: 295 UG/DL (ref 260–445)

## 2024-12-13 DIAGNOSIS — E83.110 HEREDITARY HEMOCHROMATOSIS (HCC): ICD-10-CM

## 2025-01-22 ENCOUNTER — OFFICE VISIT (OUTPATIENT)
Dept: INTERNAL MEDICINE CLINIC | Age: 60
End: 2025-01-22

## 2025-01-22 VITALS — OXYGEN SATURATION: 100 % | TEMPERATURE: 97.3 F | HEART RATE: 62 BPM

## 2025-01-22 DIAGNOSIS — L03.011 ACUTE PARONYCHIA OF FINGER OF RIGHT HAND: Primary | ICD-10-CM

## 2025-01-22 RX ORDER — MUPIROCIN 20 MG/G
OINTMENT TOPICAL
Qty: 15 G | Refills: 0 | Status: SHIPPED | OUTPATIENT
Start: 2025-01-22 | End: 2025-01-29

## 2025-01-22 NOTE — PROGRESS NOTES
CHIEF COMPLAINT: Earl Vines is a 59 y.o. male who presents for : Paronychia infection    HPI: Patient presented with problems with paronychia infection of his fifth digit on his right finger he does not want oral antibiotics and requests that we try to drain it    Review of Systems:   Constitutional:  Denies fever or chills   Eyes:  Denies change in visual acuity   HENT:  Denies nasal congestion or sore throat   Respiratory:  Denies cough or shortness of breath   Cardiovascular:  Denies chest pain or edema   GI:  Denies abdominal pain, nausea, vomiting, bloody stools or diarrhea   :  Denies dysuria   Musculoskeletal:  Denies back pain or joint pain   Integument:  Denies rash   Neurologic:  Denies headache, focal weakness or sensory changes   Endocrine:  Denies polyuria or polydipsia   Lymphatic:  Denies swollen glands   Psychiatric:  Denies depression or anxiety     Past Medical History:        Diagnosis Date    ADD (attention deficit disorder)     Anxiety     Hematuria 05/2021       Past Surgical History:        Procedure Laterality Date    COLONOSCOPY      COLONOSCOPY N/A 7/26/2021    COLONOSCOPY WITH BIOPSY performed by Hussein Narvaez MD at [facility] END[facility]COPY       Family History:  Family History   Problem Relation Age of Onset    Arrhythmia Mother     Heart Failure Mother     Dementia Mother     Diabetes Father     Heart Disease Father     High Blood Pressure Father        Social History:  Social History     Socioeconomic History    Marital status:    Tobacco Use    Smoking status: Never    Smokeless tobacco: Never   Vaping Use    Vaping status: Never Used   Substance and Sexual Activity    Alcohol use: Yes    Drug use: Never    Sexual activity: Yes     Partners: Female     Social Determinants of Health     Financial Resource Strain: Low Risk  (5/30/2024)    Overall Financial Resource Strain (CARDIA)     Difficulty of Paying Living Expenses: Not hard at all   Food Insecurity: No Food Insecurity

## 2025-01-25 DIAGNOSIS — E83.110 HEREDITARY HEMOCHROMATOSIS (HCC): ICD-10-CM

## 2025-01-25 LAB
FERRITIN SERPL IA-MCNC: 168 NG/ML (ref 30–400)
IRON SATN MFR SERPL: 84 % (ref 20–50)
IRON SERPL-MCNC: 237 UG/DL (ref 59–158)
TIBC SERPL-MCNC: 283 UG/DL (ref 260–445)

## 2025-01-27 ENCOUNTER — TELEPHONE (OUTPATIENT)
Dept: INTERNAL MEDICINE CLINIC | Age: 60
End: 2025-01-27

## 2025-01-27 NOTE — TELEPHONE ENCOUNTER
Ferritin test and iron test results  (Newest Message First)  View All Conversations on this Encounter  [facility] [place] [address] Practice Support (supporting Darwin Shore MD)2 days ago     FADIA Shore, I just had the blood test results from Saturday. The last time I donated was late October. The ferritin results came out at 168. I think that is a good number.  my question is related to the other Iron numbers.     Iron is 237 which is high.  TIBC is  283 who’s is normal   Iron saturation is 84 which is very high.     Should Lizama be worried about the high iron and high iron saturation numbers. Also,  I donate about once every 3 months. Should I be doing anything differently?  Renan Chao        Private car

## 2025-01-27 NOTE — TELEPHONE ENCOUNTER
Blood tests for iron level  (Newest Message First)  View All Conversations on this Encounter  Earl Vines  [facility] Support (supporting Darwin Shore MD)2 days ago     JS  Dear Dr Shore,     Should I pass along these test results to the  liver/blood specialist that you sent me to see a couple years ago?  Maybe you could forward to him? Either way… should we get his comments?     Thanks!

## 2025-01-29 ENCOUNTER — TELEPHONE (OUTPATIENT)
Dept: INTERNAL MEDICINE CLINIC | Age: 60
End: 2025-01-29

## 2025-01-29 NOTE — TELEPHONE ENCOUNTER
Blood results…iron and ferritin  (Newest Message First)  View All Conversations on this Encounter  Earl Vines  P [place] [address] Practice Support (supporting Darwin Shore MD)15 hours ago (5:50 PM)     JS  Dr Shore,     I received your voicemail.  The last donation was late Oct 2024.  I have scheduled another donation for this Friday afternoon….Feel free to call me tomorrow.     Thanks!

## 2025-02-10 NOTE — TELEPHONE ENCOUNTER
Antibiotic request  (Newest Message First)  View All Conversations on this Encounter  Earl HARTMANN [place] [address] Practice Support (supporting Renato Soriano MD)Yesterday (6:01 AM)     FADIA HARTMANN [place] [address] Practice Support (supporting Renato Soriano MD)Yesterday (5:51 AM)     FADIA Soriano, my fingernail infection has improved a little, but still is showing some symptoms and I think it’s time for the oral antibiotics like you suggested to begin with. Feel free to have your staff call in the prescription to the [facility] on [facility].

## 2025-03-03 ENCOUNTER — TELEPHONE (OUTPATIENT)
Dept: INTERNAL MEDICINE CLINIC | Age: 60
End: 2025-03-03

## 2025-03-03 DIAGNOSIS — E78.00 ELEVATED CHOLESTEROL: ICD-10-CM

## 2025-03-03 DIAGNOSIS — E83.110 HEREDITARY HEMOCHROMATOSIS: Primary | ICD-10-CM

## 2025-03-03 NOTE — TELEPHONE ENCOUNTER
Pt came in req all orders instead of just 2 because he keeps having to go back so pt would like all his orders put in at the same time.    Please call pt when done  [phone]

## 2025-03-14 DIAGNOSIS — E83.110 HEREDITARY HEMOCHROMATOSIS: ICD-10-CM

## 2025-03-15 ENCOUNTER — RESULTS FOLLOW-UP (OUTPATIENT)
Dept: INTERNAL MEDICINE CLINIC | Age: 60
End: 2025-03-15

## 2025-03-15 LAB
BASOPHILS # BLD: 0 K/UL (ref 0–0.2)
BASOPHILS NFR BLD: 0.7 %
DEPRECATED RDW RBC AUTO: 13.5 % (ref 12.4–15.4)
EOSINOPHIL # BLD: 0.1 K/UL (ref 0–0.6)
EOSINOPHIL NFR BLD: 1.8 %
FERRITIN SERPL IA-MCNC: 110 NG/ML (ref 30–400)
HCT VFR BLD AUTO: 42.6 % (ref 40.5–52.5)
HGB BLD-MCNC: 14.8 G/DL (ref 13.5–17.5)
IRON SATN MFR SERPL: 56 % (ref 20–50)
IRON SERPL-MCNC: 158 UG/DL (ref 59–158)
LYMPHOCYTES # BLD: 1.9 K/UL (ref 1–5.1)
LYMPHOCYTES NFR BLD: 32.9 %
MCH RBC QN AUTO: 32.9 PG (ref 26–34)
MCHC RBC AUTO-ENTMCNC: 34.6 G/DL (ref 31–36)
MCV RBC AUTO: 95.1 FL (ref 80–100)
MONOCYTES # BLD: 0.5 K/UL (ref 0–1.3)
MONOCYTES NFR BLD: 8.1 %
NEUTROPHILS # BLD: 3.3 K/UL (ref 1.7–7.7)
NEUTROPHILS NFR BLD: 56.5 %
PLATELET # BLD AUTO: 244 K/UL (ref 135–450)
PMV BLD AUTO: 9.9 FL (ref 5–10.5)
RBC # BLD AUTO: 4.48 M/UL (ref 4.2–5.9)
TIBC SERPL-MCNC: 282 UG/DL (ref 260–445)
WBC # BLD AUTO: 5.8 K/UL (ref 4–11)

## 2025-03-17 ENCOUNTER — TELEPHONE (OUTPATIENT)
Dept: INTERNAL MEDICINE CLINIC | Age: 60
End: 2025-03-17

## 2025-03-17 NOTE — TELEPHONE ENCOUNTER
Iron blood test results  (Newest Message First)Earl Vines to [place] [address] Practice Support (supporting Darwin Shore MD)         3/15/25 12:43 PM  One more thing  Dr Shore. The blood lab said that there is a standing order for metabolic panel. Should I get a metabolic panel now or should I wait until my June 2025 an annual physical?  Earl Vines to [place] [address] Practice Support (supporting Darwin Shore MD)         3/15/25  6:37 AM  The last time I donated was January 30. So I probably wouldn’t donate again until April 1 or so.   Thanks for your recommendations  Earl Vines to [place] [address] Practice Support (supporting Darwin Shore MD)         3/15/25  5:50 AM  Hi Dr. Shore, yesterday I had a blood test and the results are in the my chart. I wonder if you think I should do a blood donation soon or maybe I should wait a while. I don’t have any preference one way or the other. Happily, The iron levels are more normal these days. What do you recommend at this point?

## 2025-04-13 ENCOUNTER — PATIENT MESSAGE (OUTPATIENT)
Dept: INTERNAL MEDICINE CLINIC | Age: 60
End: 2025-04-13

## 2025-04-13 DIAGNOSIS — Z00.00 WELL ADULT EXAM: Primary | ICD-10-CM

## 2025-04-13 DIAGNOSIS — M79.643 PAIN OF HAND, UNSPECIFIED LATERALITY: ICD-10-CM

## 2025-04-15 ENCOUNTER — TELEPHONE (OUTPATIENT)
Dept: INTERNAL MEDICINE CLINIC | Age: 60
End: 2025-04-15

## 2025-04-15 DIAGNOSIS — Z00.00 WELL ADULT EXAM: ICD-10-CM

## 2025-04-15 LAB
ALBUMIN SERPL-MCNC: 4.2 G/DL (ref 3.4–5)
ALBUMIN/GLOB SERPL: 2.2 {RATIO} (ref 1.1–2.2)
ALP SERPL-CCNC: 50 U/L (ref 40–129)
ALT SERPL-CCNC: 29 U/L (ref 10–40)
ANION GAP SERPL CALCULATED.3IONS-SCNC: 7 MMOL/L (ref 3–16)
AST SERPL-CCNC: 22 U/L (ref 15–37)
BASOPHILS # BLD: 0 K/UL (ref 0–0.2)
BASOPHILS NFR BLD: 0.7 %
BILIRUB SERPL-MCNC: 0.4 MG/DL (ref 0–1)
BUN SERPL-MCNC: 18 MG/DL (ref 7–20)
CALCIUM SERPL-MCNC: 9 MG/DL (ref 8.3–10.6)
CHLORIDE SERPL-SCNC: 100 MMOL/L (ref 99–110)
CHOLEST SERPL-MCNC: 216 MG/DL (ref 0–199)
CO2 SERPL-SCNC: 27 MMOL/L (ref 21–32)
CREAT SERPL-MCNC: 1 MG/DL (ref 0.8–1.3)
DEPRECATED RDW RBC AUTO: 13 % (ref 12.4–15.4)
EOSINOPHIL # BLD: 0.1 K/UL (ref 0–0.6)
EOSINOPHIL NFR BLD: 2.2 %
GFR SERPLBLD CREATININE-BSD FMLA CKD-EPI: 85 ML/MIN/{1.73_M2}
GLUCOSE SERPL-MCNC: 101 MG/DL (ref 70–99)
HCT VFR BLD AUTO: 45.1 % (ref 40.5–52.5)
HDLC SERPL-MCNC: 48 MG/DL (ref 40–60)
HGB BLD-MCNC: 15.5 G/DL (ref 13.5–17.5)
LDLC SERPL CALC-MCNC: 151 MG/DL
LYMPHOCYTES # BLD: 1.4 K/UL (ref 1–5.1)
LYMPHOCYTES NFR BLD: 34.5 %
MCH RBC QN AUTO: 32.8 PG (ref 26–34)
MCHC RBC AUTO-ENTMCNC: 34.4 G/DL (ref 31–36)
MCV RBC AUTO: 95.3 FL (ref 80–100)
MONOCYTES # BLD: 0.4 K/UL (ref 0–1.3)
MONOCYTES NFR BLD: 9.5 %
NEUTROPHILS # BLD: 2.1 K/UL (ref 1.7–7.7)
NEUTROPHILS NFR BLD: 53.1 %
PLATELET # BLD AUTO: 203 K/UL (ref 135–450)
PMV BLD AUTO: 9.8 FL (ref 5–10.5)
POTASSIUM SERPL-SCNC: 4.7 MMOL/L (ref 3.5–5.1)
PROT SERPL-MCNC: 6.1 G/DL (ref 6.4–8.2)
RBC # BLD AUTO: 4.73 M/UL (ref 4.2–5.9)
SODIUM SERPL-SCNC: 134 MMOL/L (ref 136–145)
TRIGL SERPL-MCNC: 83 MG/DL (ref 0–150)
TSH SERPL DL<=0.005 MIU/L-ACNC: 1.39 UIU/ML (ref 0.27–4.2)
VLDLC SERPL CALC-MCNC: 17 MG/DL
WBC # BLD AUTO: 3.9 K/UL (ref 4–11)

## 2025-04-15 NOTE — TELEPHONE ENCOUNTER
Fingernail infection/problem at [facility]  (Newest Message First)             Earl Vines to [place] [address] Practice Support (supporting Renato Soriano MD) (Selected Message)        4/14/25 11:50 AM  No big deal one way or the other…  I’m happy to come back at noon  Me to Earl Vines        4/14/25 10:41 AM  I can check with Dr Shore when he comes back on wednesday    Last read by Earl Vines at 11:49AM on 4/14/2025.  Eral Vines to [place] [address] Practice Support (supporting Renato Soriano MD)        4/14/25 10:24 AM  Herminia, I just saw that my dad has an appointment at 11am… could I see Dr Shore at the same time  Earl Vines to [place] [address] Practice Support (supporting Renato Soriano MD)        4/14/25 10:23 AM  Yes… very good  Me to Earl Vines        4/14/25 10:20 AM  Dr Shore is out of the office for a few days, but he does have a 12 noon appointment on Friday , did you want that appointment?    Last read by Earl Vines at 11:49AM on 4/14/2025.  Earl Vines to [place] [address] Practice Support (supporting Renato Soriano MD)        4/13/25  3:41 PM  A couple months ago, I was in to see you about the fingernail.  At the appointment you lanced it (my choice)  and then a couple weeks later called in the antibiotics.  I took the antibiotics and had good improvement, but the swelling and probable infection is back.  Should I get an appointment with you or my regular DR. (Dr. Shore)     Renan Chao  [phone]

## 2025-04-16 ENCOUNTER — RESULTS FOLLOW-UP (OUTPATIENT)
Dept: INTERNAL MEDICINE CLINIC | Age: 60
End: 2025-04-16

## 2025-04-16 SDOH — ECONOMIC STABILITY: TRANSPORTATION INSECURITY
IN THE PAST 12 MONTHS, HAS THE LACK OF TRANSPORTATION KEPT YOU FROM MEDICAL APPOINTMENTS OR FROM GETTING MEDICATIONS?: NO

## 2025-04-16 SDOH — ECONOMIC STABILITY: TRANSPORTATION INSECURITY
IN THE PAST 12 MONTHS, HAS LACK OF TRANSPORTATION KEPT YOU FROM MEETINGS, WORK, OR FROM GETTING THINGS NEEDED FOR DAILY LIVING?: NO

## 2025-04-16 SDOH — ECONOMIC STABILITY: FOOD INSECURITY: WITHIN THE PAST 12 MONTHS, THE FOOD YOU BOUGHT JUST DIDN'T LAST AND YOU DIDN'T HAVE MONEY TO GET MORE.: NEVER TRUE

## 2025-04-16 SDOH — ECONOMIC STABILITY: INCOME INSECURITY: IN THE LAST 12 MONTHS, WAS THERE A TIME WHEN YOU WERE NOT ABLE TO PAY THE MORTGAGE OR RENT ON TIME?: NO

## 2025-04-16 SDOH — ECONOMIC STABILITY: FOOD INSECURITY: WITHIN THE PAST 12 MONTHS, YOU WORRIED THAT YOUR FOOD WOULD RUN OUT BEFORE YOU GOT MONEY TO BUY MORE.: NEVER TRUE

## 2025-04-16 ASSESSMENT — PATIENT HEALTH QUESTIONNAIRE - PHQ9
SUM OF ALL RESPONSES TO PHQ QUESTIONS 1-9: 0
SUM OF ALL RESPONSES TO PHQ9 QUESTIONS 1 & 2: 0
1. LITTLE INTEREST OR PLEASURE IN DOING THINGS: NOT AT ALL
2. FEELING DOWN, DEPRESSED OR HOPELESS: NOT AT ALL
SUM OF ALL RESPONSES TO PHQ QUESTIONS 1-9: 0
1. LITTLE INTEREST OR PLEASURE IN DOING THINGS: NOT AT ALL
SUM OF ALL RESPONSES TO PHQ QUESTIONS 1-9: 0
SUM OF ALL RESPONSES TO PHQ QUESTIONS 1-9: 0
2. FEELING DOWN, DEPRESSED OR HOPELESS: NOT AT ALL

## 2025-04-17 ENCOUNTER — OFFICE VISIT (OUTPATIENT)
Dept: INTERNAL MEDICINE CLINIC | Age: 60
End: 2025-04-17

## 2025-04-17 VITALS
TEMPERATURE: 98.7 F | HEIGHT: 75 IN | WEIGHT: 201 LBS | BODY MASS INDEX: 24.99 KG/M2 | DIASTOLIC BLOOD PRESSURE: 70 MMHG | SYSTOLIC BLOOD PRESSURE: 110 MMHG

## 2025-04-17 DIAGNOSIS — L03.011 PARONYCHIA OF FINGER, RIGHT: Primary | ICD-10-CM

## 2025-04-17 LAB — LPA SERPL-MCNC: 21 MG/DL

## 2025-04-17 NOTE — PROGRESS NOTES
Follow Up Visit Established Patient Visit    Patient:  Earl Vines                                               : 1965  Age: 59 y.o.  MRN: [id number]  Date : 2025    Earl Vines is a 59 y.o. male who presents for : Evaluation of possible infection of the nailbed of the second right finger    Chief Complaint   Patient presents with    Finger Pain     Had similar problem about 1 year ago.  Was evaluated by surgical nurse practitioner and had small incision and drainage of that area.  Noticed similar problem 1 week ago.  Does not report fever/chills.  No significant finger pain.    Past Medical History:   Diagnosis Date    ADD (attention deficit disorder)     Anxiety     Hematuria 2021       Past Surgical History:   Procedure Laterality Date    COLONOSCOPY      COLONOSCOPY N/A 2021    COLONOSCOPY WITH BIOPSY performed by Hussein Narvaez MD at [facility] ENDOSCOPY       Current Outpatient Medications   Medication Sig Dispense Refill    amoxicillin-clavulanate (AUGMENTIN) 875-125 MG per tablet Take 1 tablet by mouth 2 times daily for 7 days 14 tablet 0    sildenafil (VIAGRA) 100 MG tablet TAKE 1/2 TO ONE TABLET BY MOUTH ONCE DAILY AS NEEDED 30 tablet 1     No current facility-administered medications for this visit.       /70   Temp 98.7 °F (37.1 °C)   Ht 1.905 m (6' 3\")   Wt 91.2 kg (201 lb)   BMI 25.12 kg/m²     Physical Exam   Physical Exam  Vitals and nursing note reviewed.   Constitutional:       General: He is not in acute distress.     Appearance: Normal appearance. He is well-developed.   HENT:      Head: Normocephalic and atraumatic.      Right Ear: Tympanic membrane, ear canal and external ear normal. There is no impacted cerumen.      Left Ear: Tympanic membrane, ear canal and external ear normal. There is no impacted cerumen.      Nose:      Comments: Nasal mucosa is moderately swollen and mildly inflamed     Mouth/Throat:      Mouth: Mucous membranes are moist.

## 2025-04-23 ENCOUNTER — OFFICE VISIT (OUTPATIENT)
Dept: ORTHOPEDIC SURGERY | Age: 60
End: 2025-04-23

## 2025-04-23 VITALS — WEIGHT: 201 LBS | HEIGHT: 75 IN | BODY MASS INDEX: 24.99 KG/M2

## 2025-04-23 DIAGNOSIS — M67.449 DIGITAL MUCOUS CYST: Primary | ICD-10-CM

## 2025-04-23 NOTE — PATIENT INSTRUCTIONS
Thank you for choosing [facility] for your Hand and Upper Extremity needs.  If we can be of any further assistance to you, please do not hesitate to contact us.    Office Phone Number:  [phone]  or  [phone]

## 2025-04-23 NOTE — PROGRESS NOTES
Mr. Earl Vines is a 59 y.o. right handed man  who is seen today in Hand Surgical Consultation at the request of Darwin Shore MD.'    He presents today regarding a right Small Finger mass which have been present for approximately 1 years.  A history of antecedent trauma or injury is Absent.  He reports a prominent mass on the Eponychial aspect of the Small Finger with symptoms that include No Symptoms.  Finger symptoms are exacerbated with no activity exacerbates the symptoms.   The size of the mass has been fluctuating with time and change in activity level.  He reports that the mass has ever drained in the past as it has been 'lanced' by other providers on 2 occasions;  There has been any previous sign of infection in the region of the mass.    Previous treatment has included conservative measures and oral antibiotics and 'lancing' of the lesion on 2 prior occasions .  He does not claim relation of his symptoms to his required work activities.  He has not undergone any form of testing.    I have today reviewed with Earl Vines the clinically relevant, past medical history, medications, allergies,  family history, social history, and Review Of Systems & I have documented any details relevant to today's presenting complaints in my history above.  Mr. Earl Vines's self-reported past medical history, medications, allergies,  family history, social history, and Review Of Systems have been scanned into the chart under the \"Media\" tab.    Physical Exam:  Mr. Earl Vines's most recent vitals:  Vitals  Height: 190.5 cm (6' 3\")  Weight - Scale: 91.2 kg (201 lb)    He is well nourished, oriented to person, place & time.  He demonstrates appropriate mood and affect as well as normal gait and station.    Skin: Normal in appearance, Normal Color, and Free of Lesions Bilaterally.  There is not erythema, warmth or other sign of infection.  Digital range of motion is without significant limitation

## 2025-05-08 DIAGNOSIS — E83.110 HEREDITARY HEMOCHROMATOSIS: ICD-10-CM

## 2025-05-08 LAB
ALBUMIN SERPL-MCNC: 4.3 G/DL (ref 3.4–5)
ALBUMIN/GLOB SERPL: 2.3 {RATIO} (ref 1.1–2.2)
ALP SERPL-CCNC: 61 U/L (ref 40–129)
ALT SERPL-CCNC: 21 U/L (ref 10–40)
ANION GAP SERPL CALCULATED.3IONS-SCNC: 8 MMOL/L (ref 3–16)
AST SERPL-CCNC: 19 U/L (ref 15–37)
BASOPHILS # BLD: 0 K/UL (ref 0–0.2)
BASOPHILS NFR BLD: 0.6 %
BILIRUB SERPL-MCNC: 0.3 MG/DL (ref 0–1)
BUN SERPL-MCNC: 21 MG/DL (ref 7–20)
CALCIUM SERPL-MCNC: 9.8 MG/DL (ref 8.3–10.6)
CHLORIDE SERPL-SCNC: 103 MMOL/L (ref 99–110)
CO2 SERPL-SCNC: 27 MMOL/L (ref 21–32)
CREAT SERPL-MCNC: 0.9 MG/DL (ref 0.8–1.3)
DEPRECATED RDW RBC AUTO: 13.1 % (ref 12.4–15.4)
EOSINOPHIL # BLD: 0.1 K/UL (ref 0–0.6)
EOSINOPHIL NFR BLD: 1.7 %
FERRITIN SERPL IA-MCNC: 101 NG/ML (ref 30–400)
GFR SERPLBLD CREATININE-BSD FMLA CKD-EPI: >90 ML/MIN/{1.73_M2}
GLUCOSE SERPL-MCNC: 80 MG/DL (ref 70–99)
HCT VFR BLD AUTO: 43.3 % (ref 40.5–52.5)
HGB BLD-MCNC: 15.1 G/DL (ref 13.5–17.5)
IRON SATN MFR SERPL: 66 % (ref 20–50)
IRON SERPL-MCNC: 198 UG/DL (ref 59–158)
LYMPHOCYTES # BLD: 1.8 K/UL (ref 1–5.1)
LYMPHOCYTES NFR BLD: 32.4 %
MCH RBC QN AUTO: 32.7 PG (ref 26–34)
MCHC RBC AUTO-ENTMCNC: 34.8 G/DL (ref 31–36)
MCV RBC AUTO: 93.9 FL (ref 80–100)
MONOCYTES # BLD: 0.4 K/UL (ref 0–1.3)
MONOCYTES NFR BLD: 7.6 %
NEUTROPHILS # BLD: 3.1 K/UL (ref 1.7–7.7)
NEUTROPHILS NFR BLD: 57.7 %
PLATELET # BLD AUTO: 230 K/UL (ref 135–450)
PMV BLD AUTO: 10.2 FL (ref 5–10.5)
POTASSIUM SERPL-SCNC: 4.7 MMOL/L (ref 3.5–5.1)
PROT SERPL-MCNC: 6.2 G/DL (ref 6.4–8.2)
RBC # BLD AUTO: 4.61 M/UL (ref 4.2–5.9)
SODIUM SERPL-SCNC: 138 MMOL/L (ref 136–145)
TIBC SERPL-MCNC: 299 UG/DL (ref 260–445)
WBC # BLD AUTO: 5.4 K/UL (ref 4–11)

## 2025-06-23 ENCOUNTER — OFFICE VISIT (OUTPATIENT)
Dept: INTERNAL MEDICINE CLINIC | Age: 60
End: 2025-06-23
Payer: COMMERCIAL

## 2025-06-23 VITALS
HEIGHT: 75 IN | WEIGHT: 201 LBS | BODY MASS INDEX: 24.99 KG/M2 | DIASTOLIC BLOOD PRESSURE: 80 MMHG | SYSTOLIC BLOOD PRESSURE: 124 MMHG

## 2025-06-23 DIAGNOSIS — N52.9 ERECTILE DYSFUNCTION, UNSPECIFIED ERECTILE DYSFUNCTION TYPE: ICD-10-CM

## 2025-06-23 DIAGNOSIS — N40.1 BENIGN PROSTATIC HYPERPLASIA WITH URINARY FREQUENCY: ICD-10-CM

## 2025-06-23 DIAGNOSIS — R35.0 BENIGN PROSTATIC HYPERPLASIA WITH URINARY FREQUENCY: ICD-10-CM

## 2025-06-23 DIAGNOSIS — E78.00 PURE HYPERCHOLESTEROLEMIA: ICD-10-CM

## 2025-06-23 DIAGNOSIS — Z00.00 ENCOUNTER FOR ANNUAL PHYSICAL EXAM: Primary | ICD-10-CM

## 2025-06-23 DIAGNOSIS — E83.110 HEREDITARY HEMOCHROMATOSIS: ICD-10-CM

## 2025-06-23 PROCEDURE — 99396 PREV VISIT EST AGE 40-64: CPT | Performed by: HOSPITALIST

## 2025-06-23 ASSESSMENT — ENCOUNTER SYMPTOMS
GASTROINTESTINAL NEGATIVE: 1
RESPIRATORY NEGATIVE: 1
ALLERGIC/IMMUNOLOGIC NEGATIVE: 1

## 2025-06-23 NOTE — PROGRESS NOTES
Annual Physical Examination    Patient:  Earl Vines                                               : 1965  Age: 59 y.o.  MRN: [id number]  Date : 2025    History Obtained From:  patient    CHIEF COMPLAINT: Annual physical examination    OF PRESENT ILLNESS:   The patient is a 59 y.o. male who presents for annual physical examination.  Undergoes phlebotomy every 3 months for treatment of hereditary hemochromatosis  Remains active.  Exercises regularly.  Takes care of his activities of daily living.      Past Medical History:        Diagnosis Date    ADD (attention deficit disorder)     Anxiety     Hematuria 2021       Past Surgical History:        Procedure Laterality Date    COLONOSCOPY      COLONOSCOPY N/A 2021    COLONOSCOPY WITH BIOPSY performed by Hussein Narvaez MD at [facility]       Family History:       Problem Relation Age of Onset    Arrhythmia Mother     Heart Failure Mother     Dementia Mother     Osteoporosis Mother     Diabetes Father     Heart Disease Father     High Blood Pressure Father        Social History:   TOBACCO:   reports that he has never smoked. He has never used smokeless tobacco.  ETOH:   reports that he does not currently use alcohol after a past usage of about 1.0 standard drink of alcohol per week.  OCCUPATION:      Allergies:  Patient has no known allergies.    Current Medications:    Prior to Admission medications    Medication Sig Start Date End Date Taking? Authorizing Provider   sildenafil (VIAGRA) 100 MG tablet TAKE 1/2 TO ONE TABLET BY MOUTH ONCE DAILY AS NEEDED 24  Yes Darwin Shore MD       REVIEW OF SYSTEMS:  Review of Systems   Constitutional: Negative.    HENT:          Has dental exam every 6 months   Eyes:         Has scheduled annual eye exam. + early cataracts   Respiratory: Negative.     Cardiovascular: Negative.    Gastrointestinal: Negative.    Genitourinary:  Positive for frequency (takes lycopene and saw

## 2025-06-27 ENCOUNTER — HOSPITAL ENCOUNTER (OUTPATIENT)
Age: 60
Discharge: HOME | End: 2025-06-27
Payer: COMMERCIAL

## 2025-06-27 DIAGNOSIS — E78.00 PURE HYPERCHOLESTEROLEMIA: ICD-10-CM

## 2025-06-27 PROCEDURE — 75571 CT HRT W/O DYE W/CA TEST: CPT

## 2025-06-30 ENCOUNTER — RESULTS FOLLOW-UP (OUTPATIENT)
Dept: INTERNAL MEDICINE CLINIC | Age: 60
End: 2025-06-30

## 2025-06-30 ENCOUNTER — TELEPHONE (OUTPATIENT)
Dept: INTERNAL MEDICINE CLINIC | Age: 60
End: 2025-06-30

## 2025-06-30 DIAGNOSIS — E78.00 PURE HYPERCHOLESTEROLEMIA: Primary | ICD-10-CM

## 2025-06-30 RX ORDER — ROSUVASTATIN CALCIUM 5 MG/1
5 TABLET, COATED ORAL DAILY
Qty: 30 TABLET | Refills: 3 | Status: SHIPPED | OUTPATIENT
Start: 2025-06-30

## 2025-06-30 NOTE — PROGRESS NOTES
The patient underwent CT calcium score  The coronary calcium score is  Left main: 0  LAD: 6  Circumflex: 89  RCA: 113  PDA:0        IMPRESSION:  IMPRESSION :     Coronary calcium score of 247. Consider further evaluation if multivessel or  left main predominant disease is present.     This is in the 60 th percentile for age and gender.    The patient will be started on aspirin 81 mg daily and rosuvastatin 5 mg daily.  Will repeat lipid panel in 3 months

## 2025-06-30 NOTE — TELEPHONE ENCOUNTER
Increased cardio to see if I can drop the LDL  (Newest Message First)            Earl Vines to P [facility] Practice Support (supporting Darwin Shore MD) (Selected Message)  FADIA      6/30/25  1:51 PM  Dr Shore,     Would you call me about the above?     Thanks,     Renan  [phone]  CT CARDIAC CALCIUM SCORING

## 2025-07-22 DIAGNOSIS — E83.110 HEREDITARY HEMOCHROMATOSIS: ICD-10-CM

## 2025-07-23 LAB
BASOPHILS # BLD: 0 K/UL (ref 0–0.2)
BASOPHILS NFR BLD: 1 %
DEPRECATED RDW RBC AUTO: 13.3 % (ref 12.4–15.4)
EOSINOPHIL # BLD: 0.1 K/UL (ref 0–0.6)
EOSINOPHIL NFR BLD: 3 %
FERRITIN SERPL IA-MCNC: 119 NG/ML (ref 30–400)
HCT VFR BLD AUTO: 44.9 % (ref 40.5–52.5)
HGB BLD-MCNC: 15.2 G/DL (ref 13.5–17.5)
IRON SATN MFR SERPL: 78 % (ref 20–50)
IRON SERPL-MCNC: 215 UG/DL (ref 59–158)
LYMPHOCYTES # BLD: 1.7 K/UL (ref 1–5.1)
LYMPHOCYTES NFR BLD: 41.6 %
MCH RBC QN AUTO: 32.2 PG (ref 26–34)
MCHC RBC AUTO-ENTMCNC: 33.9 G/DL (ref 31–36)
MCV RBC AUTO: 95 FL (ref 80–100)
MONOCYTES # BLD: 0.4 K/UL (ref 0–1.3)
MONOCYTES NFR BLD: 9.6 %
NEUTROPHILS # BLD: 1.8 K/UL (ref 1.7–7.7)
NEUTROPHILS NFR BLD: 44.8 %
PLATELET # BLD AUTO: 200 K/UL (ref 135–450)
PMV BLD AUTO: 10.6 FL (ref 5–10.5)
RBC # BLD AUTO: 4.72 M/UL (ref 4.2–5.9)
TIBC SERPL-MCNC: 275 UG/DL (ref 260–445)
WBC # BLD AUTO: 4 K/UL (ref 4–11)

## 2025-08-11 DIAGNOSIS — Z12.5 SCREENING PSA (PROSTATE SPECIFIC ANTIGEN): ICD-10-CM

## 2025-08-11 DIAGNOSIS — E83.110 HEREDITARY HEMOCHROMATOSIS: ICD-10-CM

## 2025-08-11 LAB
BASOPHILS # BLD: 0 K/UL (ref 0–0.2)
BASOPHILS NFR BLD: 0.8 %
DEPRECATED RDW RBC AUTO: 13.4 % (ref 12.4–15.4)
EOSINOPHIL # BLD: 0.1 K/UL (ref 0–0.6)
EOSINOPHIL NFR BLD: 1.6 %
FERRITIN SERPL IA-MCNC: 81.4 NG/ML (ref 30–400)
HCT VFR BLD AUTO: 41.1 % (ref 40.5–52.5)
HGB BLD-MCNC: 14 G/DL (ref 13.5–17.5)
IRON SATN MFR SERPL: 64 % (ref 20–50)
IRON SERPL-MCNC: 181 UG/DL (ref 59–158)
LYMPHOCYTES # BLD: 1.5 K/UL (ref 1–5.1)
LYMPHOCYTES NFR BLD: 32.5 %
MCH RBC QN AUTO: 32.3 PG (ref 26–34)
MCHC RBC AUTO-ENTMCNC: 34.1 G/DL (ref 31–36)
MCV RBC AUTO: 94.8 FL (ref 80–100)
MONOCYTES # BLD: 0.5 K/UL (ref 0–1.3)
MONOCYTES NFR BLD: 10.1 %
NEUTROPHILS # BLD: 2.6 K/UL (ref 1.7–7.7)
NEUTROPHILS NFR BLD: 55 %
PLATELET # BLD AUTO: 241 K/UL (ref 135–450)
PMV BLD AUTO: 10.3 FL (ref 5–10.5)
PSA SERPL DL<=0.01 NG/ML-MCNC: 4.24 NG/ML (ref 0–4)
RBC # BLD AUTO: 4.34 M/UL (ref 4.2–5.9)
TIBC SERPL-MCNC: 285 UG/DL (ref 260–445)
WBC # BLD AUTO: 4.6 K/UL (ref 4–11)

## 2025-08-25 ENCOUNTER — OFFICE VISIT (OUTPATIENT)
Dept: CARDIOLOGY CLINIC | Age: 60
End: 2025-08-25
Payer: COMMERCIAL

## 2025-08-25 VITALS
BODY MASS INDEX: 24.22 KG/M2 | HEART RATE: 61 BPM | SYSTOLIC BLOOD PRESSURE: 130 MMHG | DIASTOLIC BLOOD PRESSURE: 78 MMHG | OXYGEN SATURATION: 97 % | WEIGHT: 193.8 LBS

## 2025-08-25 DIAGNOSIS — R93.1 AGATSTON CAC SCORE 200-399: ICD-10-CM

## 2025-08-25 DIAGNOSIS — I25.10 CORONARY ARTERY CALCIFICATION: Primary | ICD-10-CM

## 2025-08-25 DIAGNOSIS — E78.2 MIXED HYPERLIPIDEMIA: ICD-10-CM

## 2025-08-25 DIAGNOSIS — I25.10 CORONARY ARTERY CALCIFICATION: ICD-10-CM

## 2025-08-25 LAB — CRP SERPL HS-MCNC: 0.24 MG/L (ref 0.16–3)

## 2025-08-25 PROCEDURE — 99204 OFFICE O/P NEW MOD 45 MIN: CPT | Performed by: INTERNAL MEDICINE

## 2025-08-25 PROCEDURE — 93000 ELECTROCARDIOGRAM COMPLETE: CPT | Performed by: INTERNAL MEDICINE

## 2025-08-25 RX ORDER — ASPIRIN 81 MG/1
81 TABLET ORAL DAILY
COMMUNITY

## 2025-08-25 ASSESSMENT — ENCOUNTER SYMPTOMS
WHEEZING: 0
ABDOMINAL DISTENTION: 0
COLOR CHANGE: 0
COUGH: 0
BACK PAIN: 0
SHORTNESS OF BREATH: 0
EYE DISCHARGE: 0
ABDOMINAL PAIN: 0
VOMITING: 0
FACIAL SWELLING: 0
BLOOD IN STOOL: 0
CHEST TIGHTNESS: 0

## (undated) DEVICE — FORCEPS BX 240CM 2.4MM L NDL RAD JAW 4 M00513334